# Patient Record
Sex: MALE | Race: OTHER | NOT HISPANIC OR LATINO | URBAN - METROPOLITAN AREA
[De-identification: names, ages, dates, MRNs, and addresses within clinical notes are randomized per-mention and may not be internally consistent; named-entity substitution may affect disease eponyms.]

---

## 2018-04-29 ENCOUNTER — EMERGENCY (EMERGENCY)
Facility: HOSPITAL | Age: 78
LOS: 1 days | Discharge: HOME | End: 2018-04-29
Attending: EMERGENCY MEDICINE
Payer: COMMERCIAL

## 2018-04-29 VITALS
DIASTOLIC BLOOD PRESSURE: 80 MMHG | SYSTOLIC BLOOD PRESSURE: 126 MMHG | OXYGEN SATURATION: 98 % | RESPIRATION RATE: 18 BRPM | HEART RATE: 84 BPM | WEIGHT: 160.06 LBS

## 2018-04-29 PROCEDURE — 70486 CT MAXILLOFACIAL W/O DYE: CPT

## 2018-04-29 PROCEDURE — 71046 X-RAY EXAM CHEST 2 VIEWS: CPT | Mod: 26

## 2018-04-29 PROCEDURE — 90715 TDAP VACCINE 7 YRS/> IM: CPT

## 2018-04-29 PROCEDURE — 72125 CT NECK SPINE W/O DYE: CPT

## 2018-04-29 PROCEDURE — 70450 CT HEAD/BRAIN W/O DYE: CPT

## 2018-04-29 PROCEDURE — 70486 CT MAXILLOFACIAL W/O DYE: CPT | Mod: 26

## 2018-04-29 PROCEDURE — 72128 CT CHEST SPINE W/O DYE: CPT | Mod: 26

## 2018-04-29 PROCEDURE — 90471 IMMUNIZATION ADMIN: CPT

## 2018-04-29 PROCEDURE — 70450 CT HEAD/BRAIN W/O DYE: CPT | Mod: 26

## 2018-04-29 PROCEDURE — 99284 EMERGENCY DEPT VISIT MOD MDM: CPT | Mod: 25

## 2018-04-29 PROCEDURE — 72128 CT CHEST SPINE W/O DYE: CPT

## 2018-04-29 PROCEDURE — 99284 EMERGENCY DEPT VISIT MOD MDM: CPT

## 2018-04-29 PROCEDURE — 72125 CT NECK SPINE W/O DYE: CPT | Mod: 26

## 2018-04-29 PROCEDURE — 71046 X-RAY EXAM CHEST 2 VIEWS: CPT

## 2018-04-29 RX ORDER — TETANUS TOXOID, REDUCED DIPHTHERIA TOXOID AND ACELLULAR PERTUSSIS VACCINE, ADSORBED 5; 2.5; 8; 8; 2.5 [IU]/.5ML; [IU]/.5ML; UG/.5ML; UG/.5ML; UG/.5ML
0.5 SUSPENSION INTRAMUSCULAR ONCE
Qty: 0 | Refills: 0 | Status: COMPLETED | OUTPATIENT
Start: 2018-04-29 | End: 2018-04-29

## 2018-04-29 RX ADMIN — TETANUS TOXOID, REDUCED DIPHTHERIA TOXOID AND ACELLULAR PERTUSSIS VACCINE, ADSORBED 0.5 MILLILITER(S): 5; 2.5; 8; 8; 2.5 SUSPENSION INTRAMUSCULAR at 17:04
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
## 2018-04-29 NOTE — ED PROVIDER NOTE - CONSTITUTIONAL, MLM
no itching/no rash normal... Well appearing, well nourished, awake, alert, oriented to person, place, time/situation and in no apparent distress.
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
## 2018-04-29 NOTE — ED PROVIDER NOTE - PLAN OF CARE
Rest, increase activity as tolerated.  -- Please use 650mg Tylenol (also called acetaminophen) every 4 hours & 600mg Motrin (also called Advil or ibuprofen) every 6 hours as needed for pain/discomfort/swelling. You can get these without a prescription. Don't use more than 3500mg of Tylenol in any 24-hour period. Make sure your other prescription/over-the-counter medications don't contain any Tylenol so you don't take too much. If you have any stomach discomfort while taking Motrin, you can use TUMS or Pepcid or Zantac (these can all be bought without a prescription).   REturn to the ER for any concerns.  follow up with your physician in the next week.
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
## 2020-07-19 ENCOUNTER — EMERGENCY (EMERGENCY)
Facility: HOSPITAL | Age: 80
LOS: 1 days | Discharge: HOME | End: 2020-07-19
Attending: EMERGENCY MEDICINE
Payer: MEDICARE

## 2020-07-19 VITALS
HEART RATE: 104 BPM | HEIGHT: 66.14 IN | DIASTOLIC BLOOD PRESSURE: 70 MMHG | OXYGEN SATURATION: 99 % | WEIGHT: 139.99 LBS | TEMPERATURE: 98 F | RESPIRATION RATE: 18 BRPM | SYSTOLIC BLOOD PRESSURE: 141 MMHG

## 2020-07-19 VITALS
RESPIRATION RATE: 18 BRPM | HEART RATE: 95 BPM | OXYGEN SATURATION: 98 % | DIASTOLIC BLOOD PRESSURE: 80 MMHG | SYSTOLIC BLOOD PRESSURE: 151 MMHG | TEMPERATURE: 98 F

## 2020-07-19 DIAGNOSIS — Z98.890 OTHER SPECIFIED POSTPROCEDURAL STATES: Chronic | ICD-10-CM

## 2020-07-19 LAB
ALBUMIN SERPL ELPH-MCNC: 4.3 G/DL — SIGNIFICANT CHANGE UP (ref 3.3–5)
ALP SERPL-CCNC: 99 U/L — SIGNIFICANT CHANGE UP (ref 40–120)
ALT FLD-CCNC: 16 U/L — SIGNIFICANT CHANGE UP (ref 10–45)
ANION GAP SERPL CALC-SCNC: 10 MMOL/L — SIGNIFICANT CHANGE UP (ref 5–17)
ANION GAP SERPL CALC-SCNC: 13 MMOL/L — SIGNIFICANT CHANGE UP (ref 5–17)
APPEARANCE UR: CLEAR — SIGNIFICANT CHANGE UP
AST SERPL-CCNC: 22 U/L — SIGNIFICANT CHANGE UP (ref 10–40)
BACTERIA # UR AUTO: NEGATIVE — SIGNIFICANT CHANGE UP
BASOPHILS # BLD AUTO: 0.06 K/UL — SIGNIFICANT CHANGE UP (ref 0–0.2)
BASOPHILS NFR BLD AUTO: 0.7 % — SIGNIFICANT CHANGE UP (ref 0–2)
BILIRUB SERPL-MCNC: 0.7 MG/DL — SIGNIFICANT CHANGE UP (ref 0.2–1.2)
BILIRUB UR-MCNC: NEGATIVE — SIGNIFICANT CHANGE UP
BUN SERPL-MCNC: 12 MG/DL — SIGNIFICANT CHANGE UP (ref 7–23)
BUN SERPL-MCNC: 9 MG/DL — SIGNIFICANT CHANGE UP (ref 7–23)
CALCIUM SERPL-MCNC: 8.3 MG/DL — LOW (ref 8.4–10.5)
CALCIUM SERPL-MCNC: 9.3 MG/DL — SIGNIFICANT CHANGE UP (ref 8.4–10.5)
CHLORIDE SERPL-SCNC: 94 MMOL/L — LOW (ref 96–108)
CHLORIDE SERPL-SCNC: 99 MMOL/L — SIGNIFICANT CHANGE UP (ref 96–108)
CO2 SERPL-SCNC: 22 MMOL/L — SIGNIFICANT CHANGE UP (ref 22–31)
CO2 SERPL-SCNC: 23 MMOL/L — SIGNIFICANT CHANGE UP (ref 22–31)
COLOR SPEC: YELLOW — SIGNIFICANT CHANGE UP
CREAT SERPL-MCNC: 0.61 MG/DL — SIGNIFICANT CHANGE UP (ref 0.5–1.3)
CREAT SERPL-MCNC: 0.67 MG/DL — SIGNIFICANT CHANGE UP (ref 0.5–1.3)
DIFF PNL FLD: ABNORMAL
EOSINOPHIL # BLD AUTO: 0.17 K/UL — SIGNIFICANT CHANGE UP (ref 0–0.5)
EOSINOPHIL NFR BLD AUTO: 2.1 % — SIGNIFICANT CHANGE UP (ref 0–6)
EPI CELLS # UR: 5 /HPF — SIGNIFICANT CHANGE UP
GLUCOSE SERPL-MCNC: 110 MG/DL — HIGH (ref 70–99)
GLUCOSE SERPL-MCNC: 183 MG/DL — HIGH (ref 70–99)
GLUCOSE UR QL: ABNORMAL
HCT VFR BLD CALC: 36.4 % — LOW (ref 39–50)
HGB BLD-MCNC: 12.5 G/DL — LOW (ref 13–17)
HYALINE CASTS # UR AUTO: 3 /LPF — HIGH (ref 0–2)
IMM GRANULOCYTES NFR BLD AUTO: 0.5 % — SIGNIFICANT CHANGE UP (ref 0–1.5)
KETONES UR-MCNC: SIGNIFICANT CHANGE UP
LEUKOCYTE ESTERASE UR-ACNC: NEGATIVE — SIGNIFICANT CHANGE UP
LYMPHOCYTES # BLD AUTO: 0.82 K/UL — LOW (ref 1–3.3)
LYMPHOCYTES # BLD AUTO: 10.2 % — LOW (ref 13–44)
MCHC RBC-ENTMCNC: 30.5 PG — SIGNIFICANT CHANGE UP (ref 27–34)
MCHC RBC-ENTMCNC: 34.3 GM/DL — SIGNIFICANT CHANGE UP (ref 32–36)
MCV RBC AUTO: 88.8 FL — SIGNIFICANT CHANGE UP (ref 80–100)
MONOCYTES # BLD AUTO: 0.69 K/UL — SIGNIFICANT CHANGE UP (ref 0–0.9)
MONOCYTES NFR BLD AUTO: 8.6 % — SIGNIFICANT CHANGE UP (ref 2–14)
NEUTROPHILS # BLD AUTO: 6.24 K/UL — SIGNIFICANT CHANGE UP (ref 1.8–7.4)
NEUTROPHILS NFR BLD AUTO: 77.9 % — HIGH (ref 43–77)
NITRITE UR-MCNC: NEGATIVE — SIGNIFICANT CHANGE UP
NRBC # BLD: 0 /100 WBCS — SIGNIFICANT CHANGE UP (ref 0–0)
PH UR: 7 — SIGNIFICANT CHANGE UP (ref 5–8)
PLATELET # BLD AUTO: 229 K/UL — SIGNIFICANT CHANGE UP (ref 150–400)
POTASSIUM SERPL-MCNC: 3.6 MMOL/L — SIGNIFICANT CHANGE UP (ref 3.5–5.3)
POTASSIUM SERPL-MCNC: 3.9 MMOL/L — SIGNIFICANT CHANGE UP (ref 3.5–5.3)
POTASSIUM SERPL-SCNC: 3.6 MMOL/L — SIGNIFICANT CHANGE UP (ref 3.5–5.3)
POTASSIUM SERPL-SCNC: 3.9 MMOL/L — SIGNIFICANT CHANGE UP (ref 3.5–5.3)
PROT SERPL-MCNC: 7.1 G/DL — SIGNIFICANT CHANGE UP (ref 6–8.3)
PROT UR-MCNC: ABNORMAL
RBC # BLD: 4.1 M/UL — LOW (ref 4.2–5.8)
RBC # FLD: 12.4 % — SIGNIFICANT CHANGE UP (ref 10.3–14.5)
RBC CASTS # UR COMP ASSIST: 10 /HPF — HIGH (ref 0–4)
SODIUM SERPL-SCNC: 129 MMOL/L — LOW (ref 135–145)
SODIUM SERPL-SCNC: 132 MMOL/L — LOW (ref 135–145)
SP GR SPEC: 1.02 — SIGNIFICANT CHANGE UP (ref 1.01–1.02)
UROBILINOGEN FLD QL: ABNORMAL
WBC # BLD: 8.02 K/UL — SIGNIFICANT CHANGE UP (ref 3.8–10.5)
WBC # FLD AUTO: 8.02 K/UL — SIGNIFICANT CHANGE UP (ref 3.8–10.5)
WBC UR QL: 1 /HPF — SIGNIFICANT CHANGE UP (ref 0–5)

## 2020-07-19 PROCEDURE — 74177 CT ABD & PELVIS W/CONTRAST: CPT | Mod: 26

## 2020-07-19 PROCEDURE — 74177 CT ABD & PELVIS W/CONTRAST: CPT

## 2020-07-19 PROCEDURE — 80053 COMPREHEN METABOLIC PANEL: CPT

## 2020-07-19 PROCEDURE — 99284 EMERGENCY DEPT VISIT MOD MDM: CPT

## 2020-07-19 PROCEDURE — 80048 BASIC METABOLIC PNL TOTAL CA: CPT

## 2020-07-19 PROCEDURE — 81001 URINALYSIS AUTO W/SCOPE: CPT

## 2020-07-19 PROCEDURE — 85027 COMPLETE CBC AUTOMATED: CPT

## 2020-07-19 PROCEDURE — 99284 EMERGENCY DEPT VISIT MOD MDM: CPT | Mod: 25

## 2020-07-19 PROCEDURE — 87086 URINE CULTURE/COLONY COUNT: CPT

## 2020-07-19 RX ORDER — SODIUM CHLORIDE 9 MG/ML
1000 INJECTION INTRAMUSCULAR; INTRAVENOUS; SUBCUTANEOUS ONCE
Refills: 0 | Status: COMPLETED | OUTPATIENT
Start: 2020-07-19 | End: 2020-07-19

## 2020-07-19 RX ORDER — ACETAMINOPHEN 500 MG
650 TABLET ORAL ONCE
Refills: 0 | Status: COMPLETED | OUTPATIENT
Start: 2020-07-19 | End: 2020-07-19

## 2020-07-19 RX ADMIN — SODIUM CHLORIDE 1000 MILLILITER(S): 9 INJECTION INTRAMUSCULAR; INTRAVENOUS; SUBCUTANEOUS at 18:19

## 2020-07-19 NOTE — ED PROVIDER NOTE - OBJECTIVE STATEMENT
79 y/o M with PMH DM, Depression, BPH, hard of hearing, PSH [employer] surgery (6 years ago for BPH at [facility]) presents to ED with L kidney and L knee pain. Patient poor historian. Patient states that pain is located on the left side of his abdomen/flank for the past three days. Patient states that he has not urinated since yesterday. Increased urgency but unable to void. States that he has had a Tobias catheter in the past, years ago. Also endorses L knee pain, relieved by placing his hand on his knee and feeling heat, unknown period of time.   PCP: Iam Ryan   Urologist: Leonides Park   Denies chest pain, SOB, vomiting, hematuria, known allergies. Former smoker.   Patient gave his PCP's phone number [phone]. Called and answered by his son in law (who is a surgeon) who states that he believes that patient is in urinary retention. Denies known allergies.  Mandarin : [id number] Alexia

## 2020-07-19 NOTE — ED PROVIDER NOTE - ATTENDING CONTRIBUTION TO CARE
80y M hx BPH sp Greenlight procedure here w few days of suprapubic and LLQ discomfort, difficulty urinating since the AM. No fevers, NVDC. Bladder scan with ~300cc of urine. WIll place trinidad, Check UA,  lytes. Likely DC w OP ortho FU. 80y M hx BPH sp Greenlight procedure here w few days of suprapubic and LLQ discomfort, difficulty urinating since the AM. No fevers, NVDC. Bladder scan with ~300cc of urine. WIll place trinidad, Check UA,  lytes. Likely DC w OP uro FU. Pt also noting L knee pain, chronic, no swelling, no redness. Has FROM. Suspect arthritis pain, declining Xray, will tx w tylenol.

## 2020-07-19 NOTE — ED PROVIDER NOTE - PHYSICAL EXAMINATION
GEN: Well Appearing, Nontoxic, NAD  HEENT: NC/AT, Symm Facies. PERRL, EOMI  CV: No JVD/Bruits or stridor;  +S1S2, RRR w/o m/g/r  RESP: CTAB w/o w/r/r  ABD: Soft, nt/nd, +BS. No guarding/rebound. No RUQ tender, no CVAT  EXT/MSK: No lower extremity edema or calf tenderness. FROMx4  Left knee: No erythema, nontender, FROM without discomfort  SKIN: No erythema, lesions or rash to left knee   Neuro: No focal deficits, AOX3 with normal speech

## 2020-07-19 NOTE — ED ADULT NURSE NOTE - OBJECTIVE STATEMENT
Pt c/o left "kidney" pain and inability to urinate since last night. Abdomen flat and non distended.

## 2020-07-19 NOTE — ED PROVIDER NOTE - NS ED ROS FT
Constitutional: No fever or chills  Eyes: No visual changes  CV: No chest pain or lower extremity edema  Resp: No SOB no cough  GI: No abd pain. No nausea or vomiting. No diarrhea.   : +left kidney pain +urinary urgency No dysuria, hematuria.   MSK: +left knee pain  Skin: No rash  Psych: No complaints   Neuro: No headache. No numbness or tingling. No weakness.

## 2020-07-19 NOTE — ED PROVIDER NOTE - NSFOLLOWUPINSTRUCTIONS_ED_ALL_ED_FT
1. Rest. Stay hydrated.   2. Continue your current home medications. Please leave the Tobias catheter in place.   3. Follow-up with your medical doctor in 2-3 days. Please call your urologist tomorrow to make an appointment within the next 2-3 days. Bring your results with you for follow-up.  4. Return to the ER if you have any new or worsening symptoms, blood in urine, pain in penis/abdomen, vomiting, chest pain, difficulty breathing, fevers, chills, weakness, or any other concerns. 1. Rest. Stay hydrated.   2. Continue your current home medications. Please leave the Tobias catheter in place.   3. Follow-up with your medical doctor in 2-3 days. Please call your urologist tomorrow to make an appointment within the next 2-3 days. Bring your results with you for follow-up.  4. Return to the ER if you have any new or worsening symptoms, blood in urine, pain in penis/abdomen, vomiting, chest pain, difficulty breathing, fevers, chills, weakness, or any other concerns.  5. Follow up with urologist at [phone]- call for appointment

## 2020-07-19 NOTE — ED PROVIDER NOTE - PMH
Type 2 diabetes mellitus without complication, unspecified whether long term insulin use Depression    Type 2 diabetes mellitus without complication, unspecified whether long term insulin use

## 2020-07-19 NOTE — ED PROVIDER NOTE - PATIENT PORTAL LINK FT
You can access the FollowMyHealth Patient Portal offered by [facility] by registering at the following website: [email or web address]. By joining [facility]’s FollowMyHealth portal, you will also be able to view your health information using other applications (apps) compatible with our system.

## 2020-07-20 LAB
CULTURE RESULTS: NO GROWTH — SIGNIFICANT CHANGE UP
SPECIMEN SOURCE: SIGNIFICANT CHANGE UP

## 2020-11-05 PROBLEM — Z00.00 ENCOUNTER FOR PREVENTIVE HEALTH EXAMINATION: Status: ACTIVE | Noted: 2020-11-05

## 2020-11-12 ENCOUNTER — OUTPATIENT (OUTPATIENT)
Dept: OUTPATIENT SERVICES | Facility: HOSPITAL | Age: 80
LOS: 1 days | End: 2020-11-12
Payer: MEDICARE

## 2020-11-12 ENCOUNTER — APPOINTMENT (OUTPATIENT)
Dept: ULTRASOUND IMAGING | Facility: CLINIC | Age: 80
End: 2020-11-12
Payer: MEDICARE

## 2020-11-12 DIAGNOSIS — Z98.890 OTHER SPECIFIED POSTPROCEDURAL STATES: Chronic | ICD-10-CM

## 2020-11-12 DIAGNOSIS — Z00.8 ENCOUNTER FOR OTHER GENERAL EXAMINATION: ICD-10-CM

## 2020-11-12 PROCEDURE — 76770 US EXAM ABDO BACK WALL COMP: CPT

## 2020-11-12 PROCEDURE — 76770 US EXAM ABDO BACK WALL COMP: CPT | Mod: 26

## 2021-05-28 ENCOUNTER — INPATIENT (INPATIENT)
Facility: HOSPITAL | Age: 81
LOS: 1 days | Discharge: HOME | DRG: 312 | End: 2021-05-30
Attending: INTERNAL MEDICINE | Admitting: INTERNAL MEDICINE
Payer: MEDICARE

## 2021-05-28 VITALS
TEMPERATURE: 98 F | RESPIRATION RATE: 18 BRPM | HEART RATE: 90 BPM | DIASTOLIC BLOOD PRESSURE: 58 MMHG | WEIGHT: 139.99 LBS | SYSTOLIC BLOOD PRESSURE: 115 MMHG | OXYGEN SATURATION: 100 % | HEIGHT: 64 IN

## 2021-05-28 DIAGNOSIS — R53.1 WEAKNESS: ICD-10-CM

## 2021-05-28 DIAGNOSIS — Z98.890 OTHER SPECIFIED POSTPROCEDURAL STATES: Chronic | ICD-10-CM

## 2021-05-28 LAB
ALBUMIN SERPL ELPH-MCNC: 4.3 G/DL — SIGNIFICANT CHANGE UP (ref 3.3–5)
ALP SERPL-CCNC: 95 U/L — SIGNIFICANT CHANGE UP (ref 40–120)
ALT FLD-CCNC: 16 U/L — SIGNIFICANT CHANGE UP (ref 10–45)
ANION GAP SERPL CALC-SCNC: 16 MMOL/L — SIGNIFICANT CHANGE UP (ref 5–17)
APPEARANCE UR: CLEAR — SIGNIFICANT CHANGE UP
AST SERPL-CCNC: 32 U/L — SIGNIFICANT CHANGE UP (ref 10–40)
BACTERIA # UR AUTO: NEGATIVE — SIGNIFICANT CHANGE UP
BASE EXCESS BLDV CALC-SCNC: -0.8 MMOL/L — SIGNIFICANT CHANGE UP (ref -2–2)
BASOPHILS # BLD AUTO: 0.08 K/UL — SIGNIFICANT CHANGE UP (ref 0–0.2)
BASOPHILS NFR BLD AUTO: 0.4 % — SIGNIFICANT CHANGE UP (ref 0–2)
BILIRUB SERPL-MCNC: 0.4 MG/DL — SIGNIFICANT CHANGE UP (ref 0.2–1.2)
BILIRUB UR-MCNC: NEGATIVE — SIGNIFICANT CHANGE UP
BUN SERPL-MCNC: 19 MG/DL — SIGNIFICANT CHANGE UP (ref 7–23)
CA-I SERPL-SCNC: 1.15 MMOL/L — SIGNIFICANT CHANGE UP (ref 1.12–1.3)
CALCIUM SERPL-MCNC: 9.3 MG/DL — SIGNIFICANT CHANGE UP (ref 8.4–10.5)
CHLORIDE BLDV-SCNC: 104 MMOL/L — SIGNIFICANT CHANGE UP (ref 96–108)
CHLORIDE SERPL-SCNC: 99 MMOL/L — SIGNIFICANT CHANGE UP (ref 96–108)
CO2 BLDV-SCNC: 26 MMOL/L — SIGNIFICANT CHANGE UP (ref 22–30)
CO2 SERPL-SCNC: 19 MMOL/L — LOW (ref 22–31)
COLOR SPEC: SIGNIFICANT CHANGE UP
CREAT SERPL-MCNC: 0.88 MG/DL — SIGNIFICANT CHANGE UP (ref 0.5–1.3)
DIFF PNL FLD: ABNORMAL
EOSINOPHIL # BLD AUTO: 0.31 K/UL — SIGNIFICANT CHANGE UP (ref 0–0.5)
EOSINOPHIL NFR BLD AUTO: 1.7 % — SIGNIFICANT CHANGE UP (ref 0–6)
EPI CELLS # UR: 0 /HPF — SIGNIFICANT CHANGE UP
GAS PNL BLDV: 134 MMOL/L — LOW (ref 135–145)
GAS PNL BLDV: SIGNIFICANT CHANGE UP
GAS PNL BLDV: SIGNIFICANT CHANGE UP
GLUCOSE BLDV-MCNC: 123 MG/DL — HIGH (ref 70–99)
GLUCOSE SERPL-MCNC: 120 MG/DL — HIGH (ref 70–99)
GLUCOSE UR QL: NEGATIVE — SIGNIFICANT CHANGE UP
HCO3 BLDV-SCNC: 25 MMOL/L — SIGNIFICANT CHANGE UP (ref 21–29)
HCT VFR BLD CALC: 38.2 % — LOW (ref 39–50)
HCT VFR BLDA CALC: 40 % — SIGNIFICANT CHANGE UP (ref 39–50)
HGB BLD CALC-MCNC: 13.1 G/DL — SIGNIFICANT CHANGE UP (ref 13–17)
HGB BLD-MCNC: 13 G/DL — SIGNIFICANT CHANGE UP (ref 13–17)
HYALINE CASTS # UR AUTO: 1 /LPF — SIGNIFICANT CHANGE UP (ref 0–2)
IMM GRANULOCYTES NFR BLD AUTO: 1.2 % — SIGNIFICANT CHANGE UP (ref 0–1.5)
KETONES UR-MCNC: NEGATIVE — SIGNIFICANT CHANGE UP
LACTATE BLDV-MCNC: 2.1 MMOL/L — HIGH (ref 0.7–2)
LEUKOCYTE ESTERASE UR-ACNC: NEGATIVE — SIGNIFICANT CHANGE UP
LYMPHOCYTES # BLD AUTO: 0.7 K/UL — LOW (ref 1–3.3)
LYMPHOCYTES # BLD AUTO: 3.8 % — LOW (ref 13–44)
MCHC RBC-ENTMCNC: 30.4 PG — SIGNIFICANT CHANGE UP (ref 27–34)
MCHC RBC-ENTMCNC: 34 GM/DL — SIGNIFICANT CHANGE UP (ref 32–36)
MCV RBC AUTO: 89.3 FL — SIGNIFICANT CHANGE UP (ref 80–100)
MONOCYTES # BLD AUTO: 0.95 K/UL — HIGH (ref 0–0.9)
MONOCYTES NFR BLD AUTO: 5.1 % — SIGNIFICANT CHANGE UP (ref 2–14)
NEUTROPHILS # BLD AUTO: 16.23 K/UL — HIGH (ref 1.8–7.4)
NEUTROPHILS NFR BLD AUTO: 87.8 % — HIGH (ref 43–77)
NITRITE UR-MCNC: NEGATIVE — SIGNIFICANT CHANGE UP
NRBC # BLD: 0 /100 WBCS — SIGNIFICANT CHANGE UP (ref 0–0)
OB PNL STL: NEGATIVE — SIGNIFICANT CHANGE UP
PCO2 BLDV: 49 MMHG — SIGNIFICANT CHANGE UP (ref 35–50)
PH BLDV: 7.33 — LOW (ref 7.35–7.45)
PH UR: 6 — SIGNIFICANT CHANGE UP (ref 5–8)
PLATELET # BLD AUTO: 221 K/UL — SIGNIFICANT CHANGE UP (ref 150–400)
PO2 BLDV: 31 MMHG — SIGNIFICANT CHANGE UP (ref 25–45)
POTASSIUM BLDV-SCNC: 3.9 MMOL/L — SIGNIFICANT CHANGE UP (ref 3.5–5.3)
POTASSIUM SERPL-MCNC: 4.9 MMOL/L — SIGNIFICANT CHANGE UP (ref 3.5–5.3)
POTASSIUM SERPL-SCNC: 4.9 MMOL/L — SIGNIFICANT CHANGE UP (ref 3.5–5.3)
PROT SERPL-MCNC: 7.9 G/DL — SIGNIFICANT CHANGE UP (ref 6–8.3)
PROT UR-MCNC: SIGNIFICANT CHANGE UP
RBC # BLD: 4.28 M/UL — SIGNIFICANT CHANGE UP (ref 4.2–5.8)
RBC # FLD: 12.6 % — SIGNIFICANT CHANGE UP (ref 10.3–14.5)
RBC CASTS # UR COMP ASSIST: 5 /HPF — HIGH (ref 0–4)
SAO2 % BLDV: 57 % — LOW (ref 67–88)
SARS-COV-2 RNA SPEC QL NAA+PROBE: SIGNIFICANT CHANGE UP
SODIUM SERPL-SCNC: 134 MMOL/L — LOW (ref 135–145)
SP GR SPEC: 1.01 — SIGNIFICANT CHANGE UP (ref 1.01–1.02)
TROPONIN T, HIGH SENSITIVITY RESULT: 21 NG/L — SIGNIFICANT CHANGE UP (ref 0–51)
TROPONIN T, HIGH SENSITIVITY RESULT: 22 NG/L — SIGNIFICANT CHANGE UP (ref 0–51)
UROBILINOGEN FLD QL: NEGATIVE — SIGNIFICANT CHANGE UP
WBC # BLD: 18.49 K/UL — HIGH (ref 3.8–10.5)
WBC # FLD AUTO: 18.49 K/UL — HIGH (ref 3.8–10.5)
WBC UR QL: 1 /HPF — SIGNIFICANT CHANGE UP (ref 0–5)

## 2021-05-28 PROCEDURE — 93010 ELECTROCARDIOGRAM REPORT: CPT | Mod: GC

## 2021-05-28 PROCEDURE — 71045 X-RAY EXAM CHEST 1 VIEW: CPT | Mod: 26

## 2021-05-28 PROCEDURE — 99285 EMERGENCY DEPT VISIT HI MDM: CPT | Mod: CS,GC

## 2021-05-28 RX ORDER — SODIUM CHLORIDE 9 MG/ML
1000 INJECTION INTRAMUSCULAR; INTRAVENOUS; SUBCUTANEOUS ONCE
Refills: 0 | Status: COMPLETED | OUTPATIENT
Start: 2021-05-28 | End: 2021-05-28

## 2021-05-28 RX ADMIN — SODIUM CHLORIDE 1000 MILLILITER(S): 9 INJECTION INTRAMUSCULAR; INTRAVENOUS; SUBCUTANEOUS at 15:47

## 2021-05-28 NOTE — ED PROVIDER NOTE - PHYSICAL EXAMINATION
GENERAL: Awake, alert, NAD  HEENT: NC/AT, moist mucous membranes  LUNGS: CTAB, no wheezes or crackles   CARDIAC: RRR, no m/r/g  ABDOMEN: Soft, normal BS, non tender, non distended, no rebound, no guarding  BACK: No midline spinal tenderness, no CVA tenderness  EXT: No edema, no calf tenderness, 2+ DP pulses bilaterally, no deformities.  NEURO: A&Ox3. Moving all extremities.  SKIN: Warm and dry. No rash.  PSYCH: Normal affect.

## 2021-05-28 NOTE — ED ADULT NURSE REASSESSMENT NOTE - NS ED NURSE REASSESS COMMENT FT1
Patient stood at bedside with one assist and steady on feet (no complaints of dizziness, headache), unable to pee. ED MD Ashley meza. Patient stood at bedside with one assist and steady on feet (no complaints of dizziness, headache), unable to pee. ED MD Ramirez aware. Patient re-educated about use of call bell and to ring for help if assistance is required; verbalized understanding. Both side rails up for safety, wheels locked and bed in lowest position.

## 2021-05-28 NOTE — ED PROVIDER NOTE - ATTENDING CONTRIBUTION TO CARE
79 y/o m Mandarin speaking male ( [id number]) had an episode of generalized weakness today. got up usual time and felt in usual state of health but then felt a generalized fatigue and had to sit. unable to get up, pushed himself to another room and laid down. no improvement, came to hospital no new meds. no fever no cough no chills no abd pain or chest pain. still feels tired and without any energy. no focal weakness. no urinary complaints.   Gen.  no acute distress, elderly male  HEENT:  Pupils 3mm reactive to light b/l eomi, dry mm  Lungs:  b/l bs  CVS: S1S2   Abd;  soft non tender no distention  Ext: no pitting edema or erythema  Neuro: aaox3 no focal deficits  MSK: strength 5/5 b/l upper and lower ext.

## 2021-05-28 NOTE — ED ADULT NURSE REASSESSMENT NOTE - NS ED NURSE REASSESS COMMENT FT1
pt reports dizziness upon standing for orthostatic VS, pt found to be hypotensive and tachycardic when standing
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
## 2021-05-28 NOTE — ED ADULT NURSE REASSESSMENT NOTE - NS ED NURSE REASSESS COMMENT FT1
Report received from Dontrell MARTIN. Patient resting in bed. Safety and comfort measures maintained.

## 2021-05-28 NOTE — ED PROVIDER NOTE - OBJECTIVE STATEMENT
79 y/o male with hx of HTN and DM presenting to the ED c/o weakness. Reports he was walking to the bathroom this morning when he felt lightheaded and had to sit down. Was unable to stand up and crawled to the bed where he pulled himself up. Now feeling persistently weak and lightheaded. Denies CP, SOB, fevers, chills, nausea, vomiting, rectal bleeding melena.
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
## 2021-05-28 NOTE — ED PROVIDER NOTE - CLINICAL SUMMARY MEDICAL DECISION MAKING FREE TEXT BOX
79 y/o male with hx of HTN, DM presenting to the ED c/o weakness. Patient hypotensive and orthostatic. Exam with no focal abnormality. Concern for acute causes of orthostatic hypotension i.e dehydration vs anemia vs less likely syncope. Plan for labs, fluids, patient will likely require admission. Montez Pagan, DO PGY2 81 y/o male with hx of HTN, DM presenting to the ED c/o weakness. Patient hypotensive and orthostatic. Exam with no focal abnormality. Concern for acute causes of orthostatic hypotension i.e dehydration vs anemia vs less likely syncope. Plan for labs, fluids, patient will likely require admission. Montez Pagan, DO PGY2  ATTG: : weakness and fatigue (generalized) cocnern for infection / cardiac check labs, check urinalyisis, check xray chest, ivf, and re eval for dispo

## 2021-05-28 NOTE — ED ADULT NURSE REASSESSMENT NOTE - NS ED NURSE REASSESS COMMENT FT1
Patient straight cathed for residual urine (650mLs of yellow urine, no odor noted, no cloudiness). RN and PCA at bedside to ensure sterility.

## 2021-05-29 ENCOUNTER — TRANSCRIPTION ENCOUNTER (OUTPATIENT)
Age: 81
End: 2021-05-29

## 2021-05-29 DIAGNOSIS — Z29.9 ENCOUNTER FOR PROPHYLACTIC MEASURES, UNSPECIFIED: ICD-10-CM

## 2021-05-29 DIAGNOSIS — R65.10 SYSTEMIC INFLAMMATORY RESPONSE SYNDROME (SIRS) OF NON-INFECTIOUS ORIGIN WITHOUT ACUTE ORGAN DYSFUNCTION: ICD-10-CM

## 2021-05-29 DIAGNOSIS — D72.829 ELEVATED WHITE BLOOD CELL COUNT, UNSPECIFIED: ICD-10-CM

## 2021-05-29 DIAGNOSIS — R53.1 WEAKNESS: ICD-10-CM

## 2021-05-29 DIAGNOSIS — R33.9 RETENTION OF URINE, UNSPECIFIED: ICD-10-CM

## 2021-05-29 DIAGNOSIS — Z90.49 ACQUIRED ABSENCE OF OTHER SPECIFIED PARTS OF DIGESTIVE TRACT: Chronic | ICD-10-CM

## 2021-05-29 DIAGNOSIS — E11.9 TYPE 2 DIABETES MELLITUS WITHOUT COMPLICATIONS: ICD-10-CM

## 2021-05-29 DIAGNOSIS — I10 ESSENTIAL (PRIMARY) HYPERTENSION: ICD-10-CM

## 2021-05-29 LAB
A1C WITH ESTIMATED AVERAGE GLUCOSE RESULT: 6.4 % — HIGH (ref 4–5.6)
ALBUMIN SERPL ELPH-MCNC: 4.5 G/DL — SIGNIFICANT CHANGE UP (ref 3.3–5)
ALP SERPL-CCNC: 92 U/L — SIGNIFICANT CHANGE UP (ref 40–120)
ALT FLD-CCNC: 13 U/L — SIGNIFICANT CHANGE UP (ref 10–45)
ANION GAP SERPL CALC-SCNC: 17 MMOL/L — SIGNIFICANT CHANGE UP (ref 5–17)
AST SERPL-CCNC: 19 U/L — SIGNIFICANT CHANGE UP (ref 10–40)
BILIRUB SERPL-MCNC: 0.9 MG/DL — SIGNIFICANT CHANGE UP (ref 0.2–1.2)
BUN SERPL-MCNC: 16 MG/DL — SIGNIFICANT CHANGE UP (ref 7–23)
CALCIUM SERPL-MCNC: 9.4 MG/DL — SIGNIFICANT CHANGE UP (ref 8.4–10.5)
CHLORIDE SERPL-SCNC: 101 MMOL/L — SIGNIFICANT CHANGE UP (ref 96–108)
CO2 SERPL-SCNC: 20 MMOL/L — LOW (ref 22–31)
CREAT SERPL-MCNC: 0.9 MG/DL — SIGNIFICANT CHANGE UP (ref 0.5–1.3)
CULTURE RESULTS: NO GROWTH — SIGNIFICANT CHANGE UP
ESTIMATED AVERAGE GLUCOSE: 137 MG/DL — HIGH (ref 68–114)
GLUCOSE SERPL-MCNC: 100 MG/DL — HIGH (ref 70–99)
HCT VFR BLD CALC: 42.3 % — SIGNIFICANT CHANGE UP (ref 39–50)
HGB BLD-MCNC: 14.3 G/DL — SIGNIFICANT CHANGE UP (ref 13–17)
LACTATE SERPL-SCNC: 2.5 MMOL/L — HIGH (ref 0.7–2)
MAGNESIUM SERPL-MCNC: 1.9 MG/DL — SIGNIFICANT CHANGE UP (ref 1.6–2.6)
MCHC RBC-ENTMCNC: 30.5 PG — SIGNIFICANT CHANGE UP (ref 27–34)
MCHC RBC-ENTMCNC: 33.8 GM/DL — SIGNIFICANT CHANGE UP (ref 32–36)
MCV RBC AUTO: 90.2 FL — SIGNIFICANT CHANGE UP (ref 80–100)
NRBC # BLD: 0 /100 WBCS — SIGNIFICANT CHANGE UP (ref 0–0)
PHOSPHATE SERPL-MCNC: 3.2 MG/DL — SIGNIFICANT CHANGE UP (ref 2.5–4.5)
PLATELET # BLD AUTO: 248 K/UL — SIGNIFICANT CHANGE UP (ref 150–400)
POTASSIUM SERPL-MCNC: 3.5 MMOL/L — SIGNIFICANT CHANGE UP (ref 3.5–5.3)
POTASSIUM SERPL-SCNC: 3.5 MMOL/L — SIGNIFICANT CHANGE UP (ref 3.5–5.3)
PROT SERPL-MCNC: 8 G/DL — SIGNIFICANT CHANGE UP (ref 6–8.3)
RBC # BLD: 4.69 M/UL — SIGNIFICANT CHANGE UP (ref 4.2–5.8)
RBC # FLD: 12.6 % — SIGNIFICANT CHANGE UP (ref 10.3–14.5)
SODIUM SERPL-SCNC: 138 MMOL/L — SIGNIFICANT CHANGE UP (ref 135–145)
SPECIMEN SOURCE: SIGNIFICANT CHANGE UP
TSH SERPL-MCNC: 1.37 UIU/ML — SIGNIFICANT CHANGE UP (ref 0.27–4.2)
WBC # BLD: 12.73 K/UL — HIGH (ref 3.8–10.5)
WBC # FLD AUTO: 12.73 K/UL — HIGH (ref 3.8–10.5)

## 2021-05-29 PROCEDURE — 99223 1ST HOSP IP/OBS HIGH 75: CPT | Mod: GC

## 2021-05-29 PROCEDURE — 12345: CPT | Mod: NC,GC

## 2021-05-29 RX ORDER — GLUCAGON INJECTION, SOLUTION 0.5 MG/.1ML
1 INJECTION, SOLUTION SUBCUTANEOUS ONCE
Refills: 0 | Status: DISCONTINUED | OUTPATIENT
Start: 2021-05-29 | End: 2021-05-30

## 2021-05-29 RX ORDER — INSULIN LISPRO 100/ML
VIAL (ML) SUBCUTANEOUS AT BEDTIME
Refills: 0 | Status: DISCONTINUED | OUTPATIENT
Start: 2021-05-29 | End: 2021-05-30

## 2021-05-29 RX ORDER — DEXTROSE 50 % IN WATER 50 %
25 SYRINGE (ML) INTRAVENOUS ONCE
Refills: 0 | Status: DISCONTINUED | OUTPATIENT
Start: 2021-05-29 | End: 2021-05-30

## 2021-05-29 RX ORDER — INSULIN LISPRO 100/ML
VIAL (ML) SUBCUTANEOUS
Refills: 0 | Status: DISCONTINUED | OUTPATIENT
Start: 2021-05-29 | End: 2021-05-30

## 2021-05-29 RX ORDER — DEXTROSE 50 % IN WATER 50 %
15 SYRINGE (ML) INTRAVENOUS ONCE
Refills: 0 | Status: DISCONTINUED | OUTPATIENT
Start: 2021-05-29 | End: 2021-05-30

## 2021-05-29 RX ORDER — DEXTROSE 50 % IN WATER 50 %
12.5 SYRINGE (ML) INTRAVENOUS ONCE
Refills: 0 | Status: DISCONTINUED | OUTPATIENT
Start: 2021-05-29 | End: 2021-05-30

## 2021-05-29 RX ORDER — SODIUM CHLORIDE 9 MG/ML
1000 INJECTION, SOLUTION INTRAVENOUS
Refills: 0 | Status: DISCONTINUED | OUTPATIENT
Start: 2021-05-29 | End: 2021-05-30

## 2021-05-29 RX ORDER — SODIUM CHLORIDE 9 MG/ML
1000 INJECTION, SOLUTION INTRAVENOUS
Refills: 0 | Status: COMPLETED | OUTPATIENT
Start: 2021-05-29 | End: 2021-05-30

## 2021-05-29 RX ORDER — SODIUM CHLORIDE 9 MG/ML
1000 INJECTION, SOLUTION INTRAVENOUS
Refills: 0 | Status: DISCONTINUED | OUTPATIENT
Start: 2021-05-29 | End: 2021-05-29

## 2021-05-29 RX ADMIN — SODIUM CHLORIDE 100 MILLILITER(S): 9 INJECTION, SOLUTION INTRAVENOUS at 16:45

## 2021-05-29 NOTE — H&P ADULT - NSHPREVIEWOFSYSTEMS_GEN_ALL_CORE
REVIEW OF SYSTEMS:    CONSTITUTIONAL: +generalized weakness prior (No fevers or chills)  EYES/ENT: No visual changes;  No vertigo or throat pain   NECK: No pain or stiffness  RESPIRATORY: No cough, wheezing, hemoptysis; No shortness of breath  CARDIOVASCULAR: No chest pain or palpitations  GASTROINTESTINAL: No abdominal or epigastric pain. No nausea, vomiting, or hematemesis; No diarrhea or constipation. No melena or hematochezia.  GENITOURINARY: No dysuria, frequency or hematuria  NEUROLOGICAL: No numbness or weakness  SKIN: No itching, burning, rashes, or lesions   All other review of systems is negative unless indicated above. REVIEW OF SYSTEMS:    CONSTITUTIONAL: +generalized weakness prior (No fevers or chills)  EYES/ENT: No visual changes;  No vertigo or throat pain   NECK: No pain or stiffness  RESPIRATORY: No cough, wheezing, hemoptysis; No shortness of breath  CARDIOVASCULAR: No chest pain or palpitations  GASTROINTESTINAL: No abdominal or epigastric pain. No nausea, vomiting, or hematemesis; No diarrhea or constipation. No melena or hematochezia.  GENITOURINARY: No dysuria, frequency or hematuria  NEUROLOGICAL: No numbness or weakness  SKIN: No itching, burning, rashes, or lesions   Psych: no anxiety or depression  All other review of systems is negative unless indicated above.

## 2021-05-29 NOTE — H&P ADULT - NSHPSOCIALHISTORY_GEN_ALL_CORE
Denies smoking cigarettes, drinking EtOH, or using illicit/recreational drug us. Lives with wife at home.

## 2021-05-29 NOTE — H&P ADULT - PROBLEM SELECTOR PLAN 1
Pt presented for generalized weakness c/b fall w/o LOC or head trauma.  - likely 2/2 orthostatic hypotension (positive test in ED) vs infection of unclear source vs malnutrition (?"tea and toast diet")  - s/p 1L NS bolus in ED  - recheck orthostatics  - start multivitamin  - continue to reassess need for further IVF Pt presented for generalized weakness c/b fall w/o LOC or head trauma.  - likely 2/2 orthostatic hypotension (positive test in ED) vs infection of unclear source vs malnutrition (?"tea and toast diet") vs ?malignancy  - s/p 1L NS bolus in ED  - recheck orthostatics  - continue to reassess need for further IVF  - start multivitamin Pt presented for generalized weakness c/b fall w/o LOC or head trauma.  - likely 2/2 orthostatic hypotension (positive test in ED) vs infection of unclear source vs malnutrition (?"tea and toast diet") vs ?malignancy vs arrhythmia   - s/p 1L NS bolus in ED  - EKG (5/28): sinus with 1st degree AV block w/ PACs and J waves  - recheck orthostatics  - continue to reassess need for further IVF  - start multivitamin Pt presented for generalized weakness c/b fall w/o LOC or head trauma.  - likely 2/2 orthostatic hypotension (positive test in ED) vs infection of unclear source vs malnutrition (?"tea and toast diet") vs ?malignancy vs arrhythmia   - s/p 1L NS bolus in ED  - EKG (5/28): sinus with 1st degree AV block w/ PACs and J waves  - recheck orthostatics  - check ESR, TSH  - continue to reassess need for further IVF  - start multivitamin  - low threshold for CTH given elderly age and recent fall (but denies head trauma and LOC) Pt presented for generalized weakness c/b fall w/o LOC or head trauma.  - likely 2/2 orthostatic hypotension (positive test in ED) vs infection of unclear source vs malnutrition (?"tea and toast diet") vs ?malignancy vs arrhythmia   - s/p 1L NS bolus in ED  - EKG (5/28): sinus with 1st degree AV block w/ PACs and J waves  - f/u repeat orthostatics  - f/u ESR, TSH  - continue to reassess need for further IVF  - start multivitamin  - low threshold for CTH given elderly age and recent fall (but denies head trauma and LOC)

## 2021-05-29 NOTE — PROGRESS NOTE ADULT - PROBLEM SELECTOR PLAN 1
Pt presented for generalized weakness c/b fall w/o LOC or head trauma.  - likely 2/2 orthostatic hypotension (positive test in ED) vs infection of unclear source vs malnutrition (?"tea and toast diet") vs ?malignancy vs arrhythmia   - s/p 1L NS bolus in ED  - EKG (5/28): sinus with 1st degree AV block w/ PACs and J waves  - f/u repeat orthostatics  - f/u ESR, TSH  - continue to reassess need for further IVF  - start multivitamin  - low threshold for CTH given elderly age and recent fall (but denies head trauma and LOC) Pt presented for generalized weakness c/b fall w/o LOC or head trauma.  - likely 2/2 orthostatic hypotension (positive test in ED) vs infection of unclear source vs malnutrition (?"tea and toast diet") vs ?malignancy vs arrhythmia   - s/p 1L NS bolus in ED  - EKG (5/28): sinus with 1st degree AV block w/ PACs and J waves  - f/u repeat orthostatics  - f/u ESR, TSH  - continue to reassess need for further IVF  - start multivitamin  - low threshold for CTH given elderly age and recent fall (but denies head trauma and LOC)  - PT eval

## 2021-05-29 NOTE — PROGRESS NOTE ADULT - PROBLEM SELECTOR PLAN 2
Pt presented with leukocytosis of WBC 18.49k and tachycardia to . Lactate elevated to 2.1 on presentation. No obvious source of infection. No fevers/chills noted.  - UA neg. COVID-19 neg. CXR clear.  - possibly in setting of ?dehydration vs infection of unclear source vs ?malignancy  - monitor off abx for now  - obtain CT A/P w/ IV contrast  - f/u BCx  - monitor BMs given report of soft stools; if diarrhea, send stool studies  - continue to monitor WBC and vital signs for improvement Pt presented with leukocytosis of WBC 18.49k and tachycardia to . Lactate elevated to 2.1 on presentation. No obvious source of infection. No fevers/chills noted.   - UA neg. COVID-19 neg. CXR clear.  - possibly in setting of ?dehydration vs infection of unclear source vs ?malignancy  - monitor off abx for now  - f/u BCx  - monitor BMs given report of soft stools; if diarrhea, send stool studies  - continue to monitor WBC and vital signs for improvement

## 2021-05-29 NOTE — H&P ADULT - NSHPPHYSICALEXAM_GEN_ALL_CORE
Vital Signs Last 24 Hrs  T(C): 36.7 (29 May 2021 05:21), Max: 36.9 (28 May 2021 16:00)  T(F): 98.1 (29 May 2021 05:21), Max: 98.5 (28 May 2021 16:00)  HR: 104 (29 May 2021 05:21) (90 - 104)  BP: 149/69 (29 May 2021 05:21) (100/60 - 149/69)  BP(mean): 73 (28 May 2021 23:00) (73 - 73)  RR: 18 (29 May 2021 05:21) (17 - 21)  SpO2: 98% (29 May 2021 05:21) (98% - 100%)    GENERAL: NAD, elderly man resting in bed  HEENT: PERRL, sclera clear  CHEST/LUNG: Clear to auscultation bilaterally; No wheezes or rales  HEART: Regular rate and rhythm; No murmurs, rubs, or gallops  ABDOMEN: Soft, Nontender, Nondistended; Bowel sounds present  EXTREMITIES:  2+ Peripheral Pulses, No clubbing, cyanosis, or edema  PSYCH: AAOx3  NEUROLOGY: non-focal  SKIN: No rashes or lesions Vital Signs Last 24 Hrs  T(C): 36.7 (29 May 2021 05:21), Max: 36.9 (28 May 2021 16:00)  T(F): 98.1 (29 May 2021 05:21), Max: 98.5 (28 May 2021 16:00)  HR: 104 (29 May 2021 05:21) (90 - 104)  BP: 149/69 (29 May 2021 05:21) (100/60 - 149/69)  BP(mean): 73 (28 May 2021 23:00) (73 - 73)  RR: 18 (29 May 2021 05:21) (17 - 21)  SpO2: 98% (29 May 2021 05:21) (98% - 100%)    GENERAL: NAD, elderly man resting in bed  HEAD:  Atraumatic, Normocephalic  EYES: PERRL, sclera clear  CHEST/LUNG: Clear to auscultation bilaterally; No wheezes or rales  HEART: Regular rate and rhythm; No murmurs, rubs, or gallops  ABDOMEN: Soft, Nontender, Nondistended; Bowel sounds present  EXTREMITIES:  2+ Peripheral Pulses, No clubbing, cyanosis, or edema  PSYCH: AAOx3  NEUROLOGY: non-focal  SKIN: No rashes or lesions Vital Signs Last 24 Hrs  T(C): 36.7 (29 May 2021 05:21), Max: 36.9 (28 May 2021 16:00)  T(F): 98.1 (29 May 2021 05:21), Max: 98.5 (28 May 2021 16:00)  HR: 104 (29 May 2021 05:21) (90 - 104)  BP: 149/69 (29 May 2021 05:21) (100/60 - 149/69)  BP(mean): 73 (28 May 2021 23:00) (73 - 73)  RR: 18 (29 May 2021 05:21) (17 - 21)  SpO2: 98% (29 May 2021 05:21) (98% - 100%)    GENERAL: NAD, elderly man resting in bed  HEAD: Atraumatic, Normocephalic  EYES: PERRL, sclera clear  CHEST/LUNG: Clear to auscultation bilaterally; No wheezes or rales  HEART: Regular rate and rhythm; No murmurs, rubs, or gallops  ABDOMEN: Soft, Nontender, Nondistended; Bowel sounds present  EXTREMITIES:  2+ Peripheral Pulses, No clubbing, cyanosis, or edema  PSYCH: AAOx3 (oriented to self, place, and year)  NEUROLOGY: non-focal, able to ambulate without difficulty  SKIN: No rashes or lesions

## 2021-05-29 NOTE — H&P ADULT - PROBLEM SELECTOR PLAN 2
Pt presented with leukocytosis of WBC 18.49k and tachycardia to . No obvious source of infection. No fevers.  - UA neg. COVID-19 neg. CXR clear.  - possibly in setting of ?dehydration vs infection of unclear source  - monitor off abx for now  - check BCx  - continue to monitor WBC and vital signs for improvement Pt presented with leukocytosis of WBC 18.49k and tachycardia to . Lactate elevated to 2.1 on presentation. No obvious source of infection. No fevers/chills noted.  - UA neg. COVID-19 neg. CXR clear.  - possibly in setting of ?dehydration vs infection of unclear source vs ?malignancy  - monitor off abx for now  - check BCx  - continue to monitor WBC and vital signs for improvement Pt presented with leukocytosis of WBC 18.49k and tachycardia to . Lactate elevated to 2.1 on presentation. No obvious source of infection. No fevers/chills noted.  - UA neg. COVID-19 neg. CXR clear.  - possibly in setting of ?dehydration vs infection of unclear source vs ?malignancy  - monitor off abx for now  - obtain CT A/P w/ IV contrast  - check BCx  - continue to monitor WBC and vital signs for improvement Pt presented with leukocytosis of WBC 18.49k and tachycardia to . Lactate elevated to 2.1 on presentation. No obvious source of infection. No fevers/chills noted.  - UA neg. COVID-19 neg. CXR clear.  - possibly in setting of ?dehydration vs infection of unclear source vs ?malignancy  - monitor off abx for now  - obtain CT A/P w/ IV contrast  - f/u BCx  - monitor BMs given report of soft stools; if diarrhea, send stool studies  - continue to monitor WBC and vital signs for improvement

## 2021-05-29 NOTE — H&P ADULT - ASSESSMENT
79 yo M w/ hx HTN and DM, presenting after fall at home in setting of generalized weakness. Found to be orthostatic and with leukocytosis. 79 yo M w/ hx HTN and DM, presenting after fall at home in setting of generalized weakness. Found to be orthostatic and with leukocytosis of unclear etiology. 81 yo M Mandarin-speaking w/ hx HTN and DM, presenting after fall at home in setting of generalized weakness. Found to be orthostatic and with leukocytosis of unclear etiology. 79 yo M Mandarin-speaking w/ hx HTN and DM2, presenting with fall, generalized weakness, found to be orthostatic and with leukocytosis of unclear etiology.

## 2021-05-29 NOTE — H&P ADULT - PROBLEM SELECTOR PLAN 3
Pt presented with urinary retention w/ reports of previous episodes in the past.  - s/p straight cath in ED with 650cc output  - likely 2/2 BPH  - will hold off on starting tamsulosin for now in setting of soft BPs  - output follow-up with PCP Pt presented with urinary retention w/ reports of previous episodes in the past.  - s/p straight cath in ED with 650cc output  - likely 2/2 BPH  - monitor I/Os  - will hold off on starting tamsulosin for now in setting of soft BPs  - output follow-up with PCP Pt presented with urinary retention w/ reports of previous episodes in the past.  - s/p straight cath in ED with 650cc output (after he could not urinate on attempt)  - likely 2/2 BPH  - monitor I/Os  - will hold off on starting tamsulosin for now in setting of soft BPs  - output follow-up with PCP

## 2021-05-29 NOTE — H&P ADULT - PROBLEM SELECTOR PLAN 6
- DVT ppx: low IMPROVE score, monitor off pharmacological ppx for now, encourage ambulation  - Diet: DASH/TLC, CC diet  - Dispo: likely home once medically optimized - DVT ppx: SCDs  - Diet: DASH/TLC, CC diet  - Dispo: likely home once medically optimized; f/u PT eval - DVT ppx: SCDs, fall precautions  - Diet: DASH/TLC, CC diet  - Dispo: likely home once medically optimized; f/u PT eval

## 2021-05-29 NOTE — DISCHARGE NOTE PROVIDER - NSDCMRMEDTOKEN_GEN_ALL_CORE_FT
Tradjenta 5 mg oral tablet: tab(s) orally once a day   metFORMIN:   Physical Therapy:   tamsulosin 0.4 mg oral capsule: 1 cap(s) orally once a day  Tradjenta 5 mg oral tablet: tab(s) orally once a day   metFORMIN: 850 milligram(s) orally once a day  Physical Therapy:   tamsulosin 0.4 mg oral capsule: 1 cap(s) orally once a day  Tradjenta 5 mg oral tablet: tab(s) orally once a day

## 2021-05-29 NOTE — DISCHARGE NOTE PROVIDER - HOSPITAL COURSE
79 yo M Mandarin-speaking w/ hx HTN and DM2, presenting after fall at home in setting of generalized weakness for 1 day. Pt reports that he woke up today and was walking without problem but endorses feeling weak/tired and laid down on couch to rest. When he tried to get up afterwards to go to the bathroom, he felt weak and fell. Denies LOC or head trauma. He could not stand back up and had to push with his legs while on the floor to go to the bed after which his wife called EMS. Pt states he was told at the time that he had low blood pressure. Denies f/c/n/v, CP, palpitations, SOB, cough, abdominal pain, dysuria, hematuria, hematochezia, melena, constipation, dizziness, weight loss, sick contacts, recent travel. Notes an episode of soft stools earlier in the day. Had an episode of urinary retention while in the hospital today and endorses having a few episodes in the past requiring straight cath. Of note, pt endorses eating/drinking as he normally does, but diet mainly consists of Chinese plain bread and noodles without much else (?"tea and toast diet").    In ED: Tmax 98.5F rectal, HR 90-100s, -110s, RR 18-21, sating % on RA. Positive orthostatics (sitting: /40, HR 94; standing BP of 74/49, ; with reports of dizziness on standing). Labs notable for WBC 18k and lactate 2.1. UA neg. COVID-19 neg. CXR clear. s/p 1L NS bolus. Admitted to Medicine for further management.      Pt received IVF. He was straight cathed X1 for urinary retention. Repeat orthostatics showed __________. Blood cultures were sent. Leukocytosis improved. He had no other signs of infection. Enalapril was held. 79 yo M Mandarin-speaking w/ hx HTN and DM2, presenting after fall at home in setting of generalized weakness for 1 day. Pt reports that he woke up today and was walking without problem but endorses feeling weak/tired and laid down on couch to rest. When he tried to get up afterwards to go to the bathroom, he felt weak and fell. Denies LOC or head trauma. He could not stand back up and had to push with his legs while on the floor to go to the bed after which his wife called EMS. Pt states he was told at the time that he had low blood pressure. Denies f/c/n/v, CP, palpitations, SOB, cough, abdominal pain, dysuria, hematuria, hematochezia, melena, constipation, dizziness, weight loss, sick contacts, recent travel. Notes an episode of soft stools earlier in the day. Had an episode of urinary retention while in the hospital today and endorses having a few episodes in the past requiring straight cath. Of note, pt endorses eating/drinking as he normally does, but diet mainly consists of Chinese plain bread and noodles without much else (?"tea and toast diet").    In ED: Tmax 98.5F rectal, HR 90-100s, -110s, RR 18-21, sating % on RA. Positive orthostatics (sitting: /40, HR 94; standing BP of 74/49, ; with reports of dizziness on standing). Labs notable for WBC 18k and lactate 2.1. UA neg. COVID-19 neg. CXR clear. s/p 1L NS bolus. Admitted to Medicine for further management.      Pt received IVF. He was straight cathed X1 for urinary retention. Repeat orthostatics improved. Blood cultures were sent. Leukocytosis improved. He had no other signs of infection. Enalapril was held.

## 2021-05-29 NOTE — H&P ADULT - NSHPLABSRESULTS_GEN_ALL_CORE
LABS:                        13.0   18.49 )-----------( 221      ( 28 May 2021 16:01 )             38.2         134<L>  |  99  |  19  ----------------------------<  120<H>  4.9   |  19<L>  |  0.88    Ca    9.3      28 May 2021 16:01    TPro  7.9  /  Alb  4.3  /  TBili  0.4  /  DBili  x   /  AST  32  /  ALT  16  /  AlkPhos  95            Urinalysis Basic - ( 28 May 2021 22:05 )    Color: Light Yellow / Appearance: Clear / S.011 / pH: x  Gluc: x / Ketone: Negative  / Bili: Negative / Urobili: Negative   Blood: x / Protein: Trace / Nitrite: Negative   Leuk Esterase: Negative / RBC: 5 /hpf / WBC 1 /HPF   Sq Epi: x / Non Sq Epi: 0 /hpf / Bacteria: Negative          RADIOLOGY & ADDITIONAL TESTS:    < from: Xray Chest 1 View- PORTABLE-Urgent (Xray Chest 1 View- PORTABLE-Urgent .) (21 @ 18:39) >  IMPRESSION:  Clear lungs.  < end of copied text >    EKG (): sinus with 1st degree AV block w/ PACs and J waves Personally reviewed imaging, labs, ekg.    LABS:                        13.0   18.49 )-----------( 221      ( 28 May 2021 16:01 )             38.2         134<L>  |  99  |  19  ----------------------------<  120<H>  4.9   |  19<L>  |  0.88    Ca    9.3      28 May 2021 16:01    TPro  7.9  /  Alb  4.3  /  TBili  0.4  /  DBili  x   /  AST  32  /  ALT  16  /  AlkPhos  95            Urinalysis Basic - ( 28 May 2021 22:05 )    Color: Light Yellow / Appearance: Clear / S.011 / pH: x  Gluc: x / Ketone: Negative  / Bili: Negative / Urobili: Negative   Blood: x / Protein: Trace / Nitrite: Negative   Leuk Esterase: Negative / RBC: 5 /hpf / WBC 1 /HPF   Sq Epi: x / Non Sq Epi: 0 /hpf / Bacteria: Negative          RADIOLOGY & ADDITIONAL TESTS:    < from: Xray Chest 1 View- PORTABLE-Urgent (Xray Chest 1 View- PORTABLE-Urgent .) (21 @ 18:39) >  IMPRESSION:  Clear lungs.  < end of copied text >    EKG (): sinus with 1st degree AV block w/ PACs and J waves

## 2021-05-29 NOTE — H&P ADULT - ATTENDING COMMENTS
81 yo M bilingual Mandarin-speaking w/ hx HTN and DM2, presenting with fall, generalized weakness, found to be orthostatic and with leukocytosis of unclear etiology. Trend WBC. CT a/p to eval for occult infection. Observe off antibiotics. PT eval. IVF.

## 2021-05-29 NOTE — H&P ADULT - HISTORY OF PRESENT ILLNESS
81 yo M w/ hx HTN and DM, presenting after fall at home in setting of generalized weakness. Pt reports that he woke up today and was walking without problem but endorses feeling weak/tired and laid down on couch to rest. When he tried to get up afterwards to go to the bathroom, he felt weak and fell. Denies LOC or head trauma. He could not stand back up and had to push with his legs while on the floor to go to the bed after which his wife called EMS. Pt states he was told at the time that he had low blood pressure. Denies f/c/n/v, CP, palpitations, SOB, cough, abdominal pain, dysuria, hematuria, hematochezia, melena, dizziness, weight loss, sick contacts, recent travel. Had an episode of urinary retention while in the hospital today and endorses having a few episodes in the past requiring straight cath. Endorses eating/drinking as he normally does, but diet mainly consists of Chinese plain bread and noodles without much else (?"tea and toast diet").    In ED: Tmax 98.5F rectal, HR 90-100s, -110s, RR 18-21, sating % on RA. Positive orthostatics (sitting: /40, HR 94; standing BP of 74/49, ; with reports of dizziness on standing). Labs notable for WBC 18k and lactate 2.1. UA neg. COVID-19 neg. CXR clear. s/p 1L NS bolus. Admitted to Medicine for further management.   81 yo M w/ hx HTN and DM, presenting after fall at home in setting of generalized weakness. Pt reports that he woke up today and was walking without problem but endorses feeling weak/tired and laid down on couch to rest. When he tried to get up afterwards to go to the bathroom, he felt weak and fell. Denies LOC or head trauma. He could not stand back up and had to push with his legs while on the floor to go to the bed after which his wife called EMS. Pt states he was told at the time that he had low blood pressure. Denies f/c/n/v, CP, palpitations, SOB, cough, abdominal pain, dysuria, hematuria, hematochezia, melena, dizziness, weight loss, sick contacts, recent travel. Had an episode of urinary retention while in the hospital today and endorses having a few episodes in the past requiring straight cath. Of note, pt endorses eating/drinking as he normally does, but diet mainly consists of Chinese plain bread and noodles without much else (?"tea and toast diet").    In ED: Tmax 98.5F rectal, HR 90-100s, -110s, RR 18-21, sating % on RA. Positive orthostatics (sitting: /40, HR 94; standing BP of 74/49, ; with reports of dizziness on standing). Labs notable for WBC 18k and lactate 2.1. UA neg. COVID-19 neg. CXR clear. s/p 1L NS bolus. Admitted to Medicine for further management.   79 yo M Mandarin-speaking w/ hx HTN and DM, presenting after fall at home in setting of generalized weakness. Pt reports that he woke up today and was walking without problem but endorses feeling weak/tired and laid down on couch to rest. When he tried to get up afterwards to go to the bathroom, he felt weak and fell. Denies LOC or head trauma. He could not stand back up and had to push with his legs while on the floor to go to the bed after which his wife called EMS. Pt states he was told at the time that he had low blood pressure. Denies f/c/n/v, CP, palpitations, SOB, cough, abdominal pain, dysuria, hematuria, hematochezia, melena, dizziness, weight loss, sick contacts, recent travel. Had an episode of urinary retention while in the hospital today and endorses having a few episodes in the past requiring straight cath. Of note, pt endorses eating/drinking as he normally does, but diet mainly consists of Chinese plain bread and noodles without much else (?"tea and toast diet").    In ED: Tmax 98.5F rectal, HR 90-100s, -110s, RR 18-21, sating % on RA. Positive orthostatics (sitting: /40, HR 94; standing BP of 74/49, ; with reports of dizziness on standing). Labs notable for WBC 18k and lactate 2.1. UA neg. COVID-19 neg. CXR clear. s/p 1L NS bolus. Admitted to Medicine for further management.   81 yo M Mandarin-speaking w/ hx HTN and DM, presenting after fall at home in setting of generalized weakness. Pt reports that he woke up today and was walking without problem but endorses feeling weak/tired and laid down on couch to rest. When he tried to get up afterwards to go to the bathroom, he felt weak and fell. Denies LOC or head trauma. He could not stand back up and had to push with his legs while on the floor to go to the bed after which his wife called EMS. Pt states he was told at the time that he had low blood pressure. Denies f/c/n/v, CP, palpitations, SOB, cough, abdominal pain, dysuria, hematuria, hematochezia, melena, constipation, dizziness, weight loss, sick contacts, recent travel. Notes an episode of soft stools earlier in the day. Had an episode of urinary retention while in the hospital today and endorses having a few episodes in the past requiring straight cath. Of note, pt endorses eating/drinking as he normally does, but diet mainly consists of Chinese plain bread and noodles without much else (?"tea and toast diet").    In ED: Tmax 98.5F rectal, HR 90-100s, -110s, RR 18-21, sating % on RA. Positive orthostatics (sitting: /40, HR 94; standing BP of 74/49, ; with reports of dizziness on standing). Labs notable for WBC 18k and lactate 2.1. UA neg. COVID-19 neg. CXR clear. s/p 1L NS bolus. Admitted to Medicine for further management.   81 yo M Mandarin-speaking w/ hx HTN and DM2, presenting after fall at home in setting of generalized weakness for 1 day. Pt reports that he woke up today and was walking without problem but endorses feeling weak/tired and laid down on couch to rest. When he tried to get up afterwards to go to the bathroom, he felt weak and fell. Denies LOC or head trauma. He could not stand back up and had to push with his legs while on the floor to go to the bed after which his wife called EMS. Pt states he was told at the time that he had low blood pressure. Denies f/c/n/v, CP, palpitations, SOB, cough, abdominal pain, dysuria, hematuria, hematochezia, melena, constipation, dizziness, weight loss, sick contacts, recent travel. Notes an episode of soft stools earlier in the day. Had an episode of urinary retention while in the hospital today and endorses having a few episodes in the past requiring straight cath. Of note, pt endorses eating/drinking as he normally does, but diet mainly consists of Chinese plain bread and noodles without much else (?"tea and toast diet").    In ED: Tmax 98.5F rectal, HR 90-100s, -110s, RR 18-21, sating % on RA. Positive orthostatics (sitting: /40, HR 94; standing BP of 74/49, ; with reports of dizziness on standing). Labs notable for WBC 18k and lactate 2.1. UA neg. COVID-19 neg. CXR clear. s/p 1L NS bolus. Admitted to Medicine for further management.

## 2021-05-29 NOTE — DISCHARGE NOTE PROVIDER - NSDCCPCAREPLAN_GEN_ALL_CORE_FT
PRINCIPAL DISCHARGE DIAGNOSIS  Diagnosis: Weakness  Assessment and Plan of Treatment: You came in with weakness and fall. Your blood pressure  was noted to be low when you stood up. We held your enalapril for this reason and gave you IV fluids. You should follow up with your PCP to restart this medication. You should ensure you eat well and remain hydrated.       PRINCIPAL DISCHARGE DIAGNOSIS  Diagnosis: Weakness  Assessment and Plan of Treatment: You came in with weakness and fall. Your blood pressure  was noted to be low when you stood up. We held your enalapril for this reason and gave you IV fluids. You should follow up with your PCP about when to restart this medication. You should ensure you eat well and remain hydrated. You may also buy compression stockings as well which can help with this problem. Please go to outpatient PT to rehab your strength.      SECONDARY DISCHARGE DIAGNOSES  Diagnosis: Leukocytosis  Assessment and Plan of Treatment: Your white blood cell count was high. Please followup with  your primary care provider to make sure this continues to improve or needs further workup.    Diagnosis: Urinary retention  Assessment and Plan of Treatment: Please continue to take your flomax once daily at night.     PRINCIPAL DISCHARGE DIAGNOSIS  Diagnosis: Weakness  Assessment and Plan of Treatment: You came in with weakness and fall. Your blood pressure  was noted to be low when you stood up. We held your enalapril for this reason and gave you IV fluids. You should follow up with your PCP about when to restart this medication. You should ensure you eat well and remain hydrated. You may also buy compression stockings as well which can help with this problem. Please go to outpatient PT to rehab your strength.      SECONDARY DISCHARGE DIAGNOSES  Diagnosis: Leukocytosis  Assessment and Plan of Treatment: Your white blood cell count was high. Please followup with  your primary care provider to make sure this continues to improve or needs further workup.    Diagnosis: DM (diabetes mellitus)  Assessment and Plan of Treatment: Please continue your diabetes medications and followup with your primary care provider.    Diagnosis: Urinary retention  Assessment and Plan of Treatment: Please continue to take your flomax once daily at night.

## 2021-05-29 NOTE — PROGRESS NOTE ADULT - SUBJECTIVE AND OBJECTIVE BOX
PROGRESS NOTE:     CONTACT INFO:  Rhiannon Melendez MD   PGY-1  Pager: [phone] [facility]    Patient is a 80y old  Male who presents with a chief complaint of weakness/fall (29 May 2021 04:58)      SUBJECTIVE / OVERNIGHT EVENTS:  No acute events overnight. Patient seen and evaluated at bedside. No fever/chills.  Denies SOB at rest, chest pain, palpitations, abdominal pain, nausea/vomiting.     ADDITIONAL REVIEW OF SYSTEMS:    Neg    MEDICATIONS  (STANDING):  dextrose 40% Gel 15 Gram(s) Oral once  dextrose 5%. 1000 milliLiter(s) (50 mL/Hr) IV Continuous <Continuous>  dextrose 5%. 1000 milliLiter(s) (100 mL/Hr) IV Continuous <Continuous>  dextrose 50% Injectable 25 Gram(s) IV Push once  dextrose 50% Injectable 12.5 Gram(s) IV Push once  dextrose 50% Injectable 25 Gram(s) IV Push once  glucagon  Injectable 1 milliGRAM(s) IntraMuscular once  insulin lispro (ADMELOG) corrective regimen sliding scale   SubCutaneous three times a day before meals  insulin lispro (ADMELOG) corrective regimen sliding scale   SubCutaneous at bedtime  lactated ringers. 1000 milliLiter(s) (100 mL/Hr) IV Continuous <Continuous>    MEDICATIONS  (PRN):      CAPILLARY BLOOD GLUCOSE      POCT Blood Glucose.: 102 mg/dL (29 May 2021 08:48)  POCT Blood Glucose.: 122 mg/dL (28 May 2021 15:18)    I&O's Summary      PHYSICAL EXAM:  Vital Signs Last 24 Hrs  T(C): 36.7 (29 May 2021 05:21), Max: 36.9 (28 May 2021 16:00)  T(F): 98.1 (29 May 2021 05:21), Max: 98.5 (28 May 2021 16:00)  HR: 104 (29 May 2021 05:21) (90 - 104)  BP: 149/69 (29 May 2021 05:21) (100/60 - 149/69)  BP(mean): 73 (28 May 2021 23:00) (73 - 73)  RR: 18 (29 May 2021 05:21) (17 - 21)  SpO2: 98% (29 May 2021 05:21) (98% - 100%)    GENERAL: NAD, elderly man resting in bed  HEAD: Atraumatic, Normocephalic  EYES: PERRL, sclera clear  CHEST/LUNG: Clear to auscultation bilaterally; No wheezes or rales  HEART: Regular rate and rhythm; No murmurs, rubs, or gallops  ABDOMEN: Soft, Nontender, Nondistended; Bowel sounds present  EXTREMITIES:  2+ Peripheral Pulses, No clubbing, cyanosis, or edema  PSYCH: AAOx3 (oriented to self, place, and year)  NEUROLOGY: non-focal, able to ambulate without difficulty  SKIN: No rashes or lesions    LABS:                        14.3   12.73 )-----------( 248      ( 29 May 2021 07:21 )             42.3         138  |  101  |  16  ----------------------------<  100<H>  3.5   |  20<L>  |  0.90    Ca    9.4      29 May 2021 07:21  Phos  3.2       Mg     1.9         TPro  8.0  /  Alb  4.5  /  TBili  0.9  /  DBili  x   /  AST  19  /  ALT  13  /  AlkPhos  92        Lactate, Blood: 2.5 mmol/L (21 @ 07:21)       Trop: 22    Urinalysis Basic - ( 28 May 2021 22:05 )    Color: Light Yellow / Appearance: Clear / S.011 / pH: x  Gluc: x / Ketone: Negative  / Bili: Negative / Urobili: Negative   Blood: x / Protein: Trace / Nitrite: Negative   Leuk Esterase: Negative / RBC: 5 /hpf / WBC 1 /HPF   Sq Epi: x / Non Sq Epi: 0 /hpf / Bacteria: Negative          RADIOLOGY & ADDITIONAL TESTS:    CXR: Clear lungs

## 2021-05-29 NOTE — H&P ADULT - PROBLEM SELECTOR PLAN 5
Hx of HTN  - hold home enalapril 10mg PO daily for now given soft BPs  - continue to monitor BP, resume if BP remains improved Hx of HTN  - hold home enalapril 10mg PO daily for now given recent soft BPs  - continue to monitor BP, resume if BP remains improved

## 2021-05-30 ENCOUNTER — TRANSCRIPTION ENCOUNTER (OUTPATIENT)
Age: 81
End: 2021-05-30

## 2021-05-30 VITALS
HEART RATE: 84 BPM | SYSTOLIC BLOOD PRESSURE: 138 MMHG | OXYGEN SATURATION: 98 % | TEMPERATURE: 98 F | DIASTOLIC BLOOD PRESSURE: 76 MMHG | RESPIRATION RATE: 18 BRPM

## 2021-05-30 LAB
A1C WITH ESTIMATED AVERAGE GLUCOSE RESULT: 6.6 % — HIGH (ref 4–5.6)
ANION GAP SERPL CALC-SCNC: 12 MMOL/L — SIGNIFICANT CHANGE UP (ref 5–17)
BUN SERPL-MCNC: 16 MG/DL — SIGNIFICANT CHANGE UP (ref 7–23)
CALCIUM SERPL-MCNC: 9 MG/DL — SIGNIFICANT CHANGE UP (ref 8.4–10.5)
CHLORIDE SERPL-SCNC: 102 MMOL/L — SIGNIFICANT CHANGE UP (ref 96–108)
CO2 SERPL-SCNC: 22 MMOL/L — SIGNIFICANT CHANGE UP (ref 22–31)
COVID-19 SPIKE DOMAIN AB INTERP: POSITIVE
COVID-19 SPIKE DOMAIN ANTIBODY RESULT: >250 U/ML — HIGH
CREAT SERPL-MCNC: 0.7 MG/DL — SIGNIFICANT CHANGE UP (ref 0.5–1.3)
ESTIMATED AVERAGE GLUCOSE: 143 MG/DL — HIGH (ref 68–114)
GLUCOSE SERPL-MCNC: 109 MG/DL — HIGH (ref 70–99)
HCT VFR BLD CALC: 34.6 % — LOW (ref 39–50)
HCT VFR BLD CALC: 36.2 % — LOW (ref 39–50)
HGB BLD-MCNC: 12.1 G/DL — LOW (ref 13–17)
HGB BLD-MCNC: 12.6 G/DL — LOW (ref 13–17)
MAGNESIUM SERPL-MCNC: 1.6 MG/DL — SIGNIFICANT CHANGE UP (ref 1.6–2.6)
MCHC RBC-ENTMCNC: 30.7 PG — SIGNIFICANT CHANGE UP (ref 27–34)
MCHC RBC-ENTMCNC: 30.8 PG — SIGNIFICANT CHANGE UP (ref 27–34)
MCHC RBC-ENTMCNC: 34.8 GM/DL — SIGNIFICANT CHANGE UP (ref 32–36)
MCHC RBC-ENTMCNC: 35 GM/DL — SIGNIFICANT CHANGE UP (ref 32–36)
MCV RBC AUTO: 88 FL — SIGNIFICANT CHANGE UP (ref 80–100)
MCV RBC AUTO: 88.3 FL — SIGNIFICANT CHANGE UP (ref 80–100)
NRBC # BLD: 0 /100 WBCS — SIGNIFICANT CHANGE UP (ref 0–0)
NRBC # BLD: 0 /100 WBCS — SIGNIFICANT CHANGE UP (ref 0–0)
PHOSPHATE SERPL-MCNC: 2.7 MG/DL — SIGNIFICANT CHANGE UP (ref 2.5–4.5)
PLATELET # BLD AUTO: 203 K/UL — SIGNIFICANT CHANGE UP (ref 150–400)
PLATELET # BLD AUTO: 206 K/UL — SIGNIFICANT CHANGE UP (ref 150–400)
POTASSIUM SERPL-MCNC: 3.3 MMOL/L — LOW (ref 3.5–5.3)
POTASSIUM SERPL-SCNC: 3.3 MMOL/L — LOW (ref 3.5–5.3)
RBC # BLD: 3.93 M/UL — LOW (ref 4.2–5.8)
RBC # BLD: 4.1 M/UL — LOW (ref 4.2–5.8)
RBC # FLD: 12.5 % — SIGNIFICANT CHANGE UP (ref 10.3–14.5)
RBC # FLD: 12.5 % — SIGNIFICANT CHANGE UP (ref 10.3–14.5)
SARS-COV-2 IGG+IGM SERPL QL IA: >250 U/ML — HIGH
SARS-COV-2 IGG+IGM SERPL QL IA: POSITIVE
SODIUM SERPL-SCNC: 136 MMOL/L — SIGNIFICANT CHANGE UP (ref 135–145)
WBC # BLD: 11.9 K/UL — HIGH (ref 3.8–10.5)
WBC # BLD: 12.46 K/UL — HIGH (ref 3.8–10.5)
WBC # FLD AUTO: 11.9 K/UL — HIGH (ref 3.8–10.5)
WBC # FLD AUTO: 12.46 K/UL — HIGH (ref 3.8–10.5)

## 2021-05-30 PROCEDURE — 99239 HOSP IP/OBS DSCHRG MGMT >30: CPT

## 2021-05-30 RX ORDER — POTASSIUM CHLORIDE 20 MEQ
40 PACKET (EA) ORAL ONCE
Refills: 0 | Status: COMPLETED | OUTPATIENT
Start: 2021-05-30 | End: 2021-05-30

## 2021-05-30 RX ADMIN — Medication 40 MILLIEQUIVALENT(S): at 13:41

## 2021-05-30 RX ADMIN — SODIUM CHLORIDE 100 MILLILITER(S): 9 INJECTION, SOLUTION INTRAVENOUS at 13:41

## 2021-05-30 RX ADMIN — SODIUM CHLORIDE 100 MILLILITER(S): 9 INJECTION, SOLUTION INTRAVENOUS at 01:55

## 2021-05-30 NOTE — PROGRESS NOTE ADULT - ASSESSMENT
79 yo M Mandarin-speaking w/ hx HTN and DM2, presenting with fall, generalized weakness, found to be orthostatic and with leukocytosis of unclear etiology.
81 yo M Mandarin-speaking w/ hx HTN and DM2, presenting with fall, generalized weakness, found to be orthostatic and with leukocytosis of unclear etiology.

## 2021-05-30 NOTE — PROGRESS NOTE ADULT - PROBLEM SELECTOR PLAN 3
Pt presented with urinary retention w/ reports of previous episodes in the past.  - s/p straight cath in ED with 650cc output (after he could not urinate on attempt)  - likely 2/2 BPH  - monitor I/Os  - will hold off on starting tamsulosin for now in setting of soft BPs  - output follow-up with PCP
Pt presented with urinary retention w/ reports of previous episodes in the past.  - s/p straight cath in ED with 650cc output (after he could not urinate on attempt)  - likely 2/2 BPH  - monitor I/Os  - will hold off on starting tamsulosin for now in setting of soft BPs  - output follow-up with PCP

## 2021-05-30 NOTE — PROGRESS NOTE ADULT - PROBLEM SELECTOR PLAN 2
Pt presented with leukocytosis of WBC 18.49k and tachycardia to . Lactate elevated to 2.1 on presentation. No obvious source of infection. No fevers/chills noted.   - UA neg. COVID-19 neg. CXR clear.  - possibly in setting of ?dehydration vs infection of unclear source vs ?malignancy  - monitor off abx for now  - f/u BCx  - monitor BMs given report of soft stools; if diarrhea, send stool studies  - continue to monitor WBC and vital signs for improvement

## 2021-05-30 NOTE — PHYSICAL THERAPY INITIAL EVALUATION ADULT - DISCHARGE DISPOSITION, PT EVAL
home w/ family and recommended outpatient PT to improve his strength, balance and endurance. No DMEs recommended./outpatient PT home w/ family and recommended outpatient PT to improve his strength, balance and endurance./outpatient PT

## 2021-05-30 NOTE — PHYSICAL THERAPY INITIAL EVALUATION ADULT - ADDITIONAL COMMENTS
As per the pt, he lives in an appt with his wife, +elevator, PTA, Pt was I in ADls/functional ambulation w/o using any AD. -DMEs at home.

## 2021-05-30 NOTE — PROGRESS NOTE ADULT - PROBLEM SELECTOR PLAN 6
- DVT ppx: SCDs, fall precautions  - Diet: DASH/TLC, CC diet  - Dispo: likely home once medically optimized; f/u PT eval
- DVT ppx: SCDs, fall precautions  - Diet: DASH/TLC, CC diet  - Dispo: likely home once medically optimized; f/u PT eval

## 2021-05-30 NOTE — PHYSICAL THERAPY INITIAL EVALUATION ADULT - STRENGTHENING, PT EVAL
Pt will demonstrate good strength in BLE/BUEs to improve limb stability during ADLs/mobility in 2 weeks

## 2021-05-30 NOTE — PROGRESS NOTE ADULT - ATTENDING COMMENTS
Leukocytosis- improved off antibiotics- cultures negative to date   Orthostasis- resolved s/p IVF  Urinary retention- resume tamsulosin   PT eval- outpatient PT  D/C home today- d/w patient's son-in-law Dr. Iam Ly- 45 minutes spent discharging the patient
Leukocytosis- improving, observing off antibiotics- cultures negative to date   Orthostasis- s/p IVF in ED- repeat orthostatics  Urinary retention- monitor output, straight cath prn  PT eval

## 2021-05-30 NOTE — PHYSICAL THERAPY INITIAL EVALUATION ADULT - PERTINENT HX OF CURRENT PROBLEM, REHAB EVAL
81 yo M Mandarin-speaking w/ hx HTN and DM2, presenting after fall at home in setting of generalized weakness for 1 day. Pt reports that he woke up today and was walking without problem but endorses feeling weak/tired and laid down on couch to rest. When he tried to get up afterwards to go to the bathroom, he felt weak and fell. Denies LOC or head trauma. He could not stand back up and had to push with his legs while on the floor to go to the bed after which his wife called EMS. lab dec h/h

## 2021-05-30 NOTE — PROGRESS NOTE ADULT - SUBJECTIVE AND OBJECTIVE BOX
Dr. Leonides Stephenson   Internal Medicine PGY-3  Pager [phone] ([facility])/[phone] ([facility])    Patient is a 80y old  Male who presents with a chief complaint of weakness/fall (29 May 2021 12:01)      SUBJECTIVE / OVERNIGHT EVENTS:  GRACIELA ON. No fevers, sweats, chills, CP, SOB, or abdominal pain.    MEDICATIONS  (STANDING):  dextrose 40% Gel 15 Gram(s) Oral once  dextrose 5%. 1000 milliLiter(s) (50 mL/Hr) IV Continuous <Continuous>  dextrose 5%. 1000 milliLiter(s) (100 mL/Hr) IV Continuous <Continuous>  dextrose 50% Injectable 25 Gram(s) IV Push once  dextrose 50% Injectable 12.5 Gram(s) IV Push once  dextrose 50% Injectable 25 Gram(s) IV Push once  glucagon  Injectable 1 milliGRAM(s) IntraMuscular once  insulin lispro (ADMELOG) corrective regimen sliding scale   SubCutaneous three times a day before meals  insulin lispro (ADMELOG) corrective regimen sliding scale   SubCutaneous at bedtime  lactated ringers. 1000 milliLiter(s) (100 mL/Hr) IV Continuous <Continuous>    MEDICATIONS  (PRN):      Vital Signs Last 24 Hrs  T(C): 36.7 (30 May 2021 04:42), Max: 36.9 (29 May 2021 12:14)  T(F): 98.1 (30 May 2021 04:42), Max: 98.5 (29 May 2021 12:14)  HR: 86 (30 May 2021 04:42) (82 - 96)  BP: 134/75 (30 May 2021 04:42) (117/78 - 134/75)  BP(mean): --  RR: 18 (30 May 2021 04:42) (18 - 18)  SpO2: 96% (30 May 2021 04:42) (96% - 98%)  CAPILLARY BLOOD GLUCOSE      POCT Blood Glucose.: 137 mg/dL (29 May 2021 22:15)  POCT Blood Glucose.: 115 mg/dL (29 May 2021 16:37)  POCT Blood Glucose.: 102 mg/dL (29 May 2021 08:48)    I&O's Summary    29 May 2021 07:01  -  30 May 2021 07:00  --------------------------------------------------------  IN: 240 mL / OUT: 400 mL / NET: -160 mL        PHYSICAL EXAM:  GENERAL: NAD, well-developed  HEAD:  Atraumatic, Normocephalic  EYES: EOMI, PERRLA, conjunctiva and sclera clear  NECK: Supple, No JVD  CHEST/LUNG: Clear to auscultation bilaterally; No wheeze  HEART: Regular rate and rhythm; No murmurs, rubs, or gallops  ABDOMEN: Soft, Nontender, Nondistended; Bowel sounds present  EXTREMITIES:  2+ Peripheral Pulses, No clubbing, cyanosis, or edema  PSYCH: AAOx3  NEUROLOGY: non-focal  SKIN: No rashes or lesions    LABS:                        14.3   12.73 )-----------( 248      ( 29 May 2021 07:21 )             42.3         138  |  101  |  16  ----------------------------<  100<H>  3.5   |  20<L>  |  0.90    Ca    9.4      29 May 2021 07:21  Phos  3.2       Mg     1.9         TPro  8.0  /  Alb  4.5  /  TBili  0.9  /  DBili  x   /  AST  19  /  ALT  13  /  AlkPhos  92            Urinalysis Basic - ( 28 May 2021 22:05 )    Color: Light Yellow / Appearance: Clear / S.011 / pH: x  Gluc: x / Ketone: Negative  / Bili: Negative / Urobili: Negative   Blood: x / Protein: Trace / Nitrite: Negative   Leuk Esterase: Negative / RBC: 5 /hpf / WBC 1 /HPF   Sq Epi: x / Non Sq Epi: 0 /hpf / Bacteria: Negative        RADIOLOGY & ADDITIONAL TESTS:    Imaging Personally Reviewed:    Consultant(s) Notes Reviewed:      Care Discussed with Consultants/Other Providers:   Dr. Leonides Stephenson   Internal Medicine PGY-3  Pager [phone] ([facility])/[phone] ([facility])    Patient is a 80y old  Male who presents with a chief complaint of weakness/fall (29 May 2021 12:01)      SUBJECTIVE / OVERNIGHT EVENTS:  GRACIELA ON. No fevers, sweats, chills, CP, SOB, or abdominal pain. Hgb decreased to 12.1.    MEDICATIONS  (STANDING):  dextrose 40% Gel 15 Gram(s) Oral once  dextrose 5%. 1000 milliLiter(s) (50 mL/Hr) IV Continuous <Continuous>  dextrose 5%. 1000 milliLiter(s) (100 mL/Hr) IV Continuous <Continuous>  dextrose 50% Injectable 25 Gram(s) IV Push once  dextrose 50% Injectable 12.5 Gram(s) IV Push once  dextrose 50% Injectable 25 Gram(s) IV Push once  glucagon  Injectable 1 milliGRAM(s) IntraMuscular once  insulin lispro (ADMELOG) corrective regimen sliding scale   SubCutaneous three times a day before meals  insulin lispro (ADMELOG) corrective regimen sliding scale   SubCutaneous at bedtime  lactated ringers. 1000 milliLiter(s) (100 mL/Hr) IV Continuous <Continuous>    MEDICATIONS  (PRN):      Vital Signs Last 24 Hrs  T(C): 36.7 (30 May 2021 04:42), Max: 36.9 (29 May 2021 12:14)  T(F): 98.1 (30 May 2021 04:42), Max: 98.5 (29 May 2021 12:14)  HR: 86 (30 May 2021 04:42) (82 - 96)  BP: 134/75 (30 May 2021 04:42) (117/78 - 134/75)  BP(mean): --  RR: 18 (30 May 2021 04:42) (18 - 18)  SpO2: 96% (30 May 2021 04:42) (96% - 98%)  CAPILLARY BLOOD GLUCOSE      POCT Blood Glucose.: 137 mg/dL (29 May 2021 22:15)  POCT Blood Glucose.: 115 mg/dL (29 May 2021 16:37)  POCT Blood Glucose.: 102 mg/dL (29 May 2021 08:48)    I&O's Summary    29 May 2021 07:01  -  30 May 2021 07:00  --------------------------------------------------------  IN: 240 mL / OUT: 400 mL / NET: -160 mL        PHYSICAL EXAM:  GENERAL: NAD, well-developed  HEAD:  Atraumatic, Normocephalic  EYES: EOMI, PERRLA, conjunctiva and sclera clear  NECK: Supple, No JVD  CHEST/LUNG: Clear to auscultation bilaterally; No wheeze  HEART: Regular rate and rhythm; No murmurs, rubs, or gallops  ABDOMEN: Soft, Nontender, Nondistended; Bowel sounds present  EXTREMITIES:  2+ Peripheral Pulses, No clubbing, cyanosis, or edema  PSYCH: AAOx3  NEUROLOGY: non-focal  SKIN: No rashes or lesions    LABS:                        14.3   12.73 )-----------( 248      ( 29 May 2021 07:21 )             42.3         138  |  101  |  16  ----------------------------<  100<H>  3.5   |  20<L>  |  0.90    Ca    9.4      29 May 2021 07:21  Phos  3.2       Mg     1.9         TPro  8.0  /  Alb  4.5  /  TBili  0.9  /  DBili  x   /  AST  19  /  ALT  13  /  AlkPhos  92            Urinalysis Basic - ( 28 May 2021 22:05 )    Color: Light Yellow / Appearance: Clear / S.011 / pH: x  Gluc: x / Ketone: Negative  / Bili: Negative / Urobili: Negative   Blood: x / Protein: Trace / Nitrite: Negative   Leuk Esterase: Negative / RBC: 5 /hpf / WBC 1 /HPF   Sq Epi: x / Non Sq Epi: 0 /hpf / Bacteria: Negative        RADIOLOGY & ADDITIONAL TESTS:    Imaging Personally Reviewed:    Consultant(s) Notes Reviewed:      Care Discussed with Consultants/Other Providers:

## 2021-05-30 NOTE — PHYSICAL THERAPY INITIAL EVALUATION ADULT - GAIT TRAINING, PT EVAL
pt will safely ambulate 100' using RW Independently in 2weeks pt will safely ambulate 300' using RW Independently in 2weeks

## 2021-05-30 NOTE — DISCHARGE NOTE NURSING/CASE MANAGEMENT/SOCIAL WORK - PATIENT PORTAL LINK FT
You can access the FollowMyHealth Patient Portal offered by [facility] by registering at the following website: [email or web address]. By joining [facility]’s FollowMyHealth portal, you will also be able to view your health information using other applications (apps) compatible with our system.

## 2021-05-30 NOTE — PROGRESS NOTE ADULT - PROBLEM SELECTOR PLAN 5
Hx of HTN  - hold home enalapril 10mg PO daily for now given recent soft BPs  - continue to monitor BP, resume if BP remains improved
Hx of HTN  - hold home enalapril 10mg PO daily for now given recent soft BPs  - continue to monitor BP, resume if BP remains improved

## 2021-05-30 NOTE — PROGRESS NOTE ADULT - PROBLEM SELECTOR PLAN 1
Pt presented for generalized weakness c/b fall w/o LOC or head trauma.  - likely 2/2 orthostatic hypotension (positive test in ED) vs infection of unclear source vs malnutrition (?"tea and toast diet") vs ?malignancy vs arrhythmia   - s/p 1L NS bolus in ED  - EKG (5/28): sinus with 1st degree AV block w/ PACs and J waves  - f/u repeat orthostatics  - f/u ESR, TSH  - continue to reassess need for further IVF  - start multivitamin  - low threshold for CTH given elderly age and recent fall (but denies head trauma and LOC)  - PT eval

## 2021-06-02 LAB
CULTURE RESULTS: SIGNIFICANT CHANGE UP
CULTURE RESULTS: SIGNIFICANT CHANGE UP
SPECIMEN SOURCE: SIGNIFICANT CHANGE UP
SPECIMEN SOURCE: SIGNIFICANT CHANGE UP

## 2022-08-15 PROBLEM — F32.9 MAJOR DEPRESSIVE DISORDER, SINGLE EPISODE, UNSPECIFIED: Chronic | Status: ACTIVE | Noted: 2020-07-19

## 2022-08-15 PROBLEM — E11.9 TYPE 2 DIABETES MELLITUS WITHOUT COMPLICATIONS: Chronic | Status: ACTIVE | Noted: 2020-07-19

## 2022-08-15 RX ORDER — METFORMIN HYDROCHLORIDE 850 MG/1
850 TABLET ORAL
Qty: 0 | Refills: 0 | DISCHARGE

## 2022-08-15 RX ORDER — TAMSULOSIN HYDROCHLORIDE 0.4 MG/1
1 CAPSULE ORAL
Qty: 0 | Refills: 0 | DISCHARGE

## 2022-08-15 RX ORDER — LINAGLIPTIN 5 MG/1
0 TABLET, FILM COATED ORAL
Qty: 0 | Refills: 0 | DISCHARGE

## 2022-08-15 RX ORDER — METFORMIN HYDROCHLORIDE 850 MG/1
0 TABLET ORAL
Qty: 0 | Refills: 0 | DISCHARGE

## 2022-08-17 NOTE — H&P ADULT - PROBLEM SELECTOR PROBLEM 3
Received call from microbiology. Patient tests positive for covid-19 by PCR.    Leukocytosis Urinary retention

## 2022-08-21 NOTE — ED ADULT TRIAGE NOTE - DOMESTIC TRAVEL HIGH RISK QUESTION
[facility] General Internal Medicine  General Internal Medicine  [address] [id number]  Phone: [phone]  Fax:   Follow Up Time: 1-3 Days     No

## 2025-09-14 ENCOUNTER — EMERGENCY (EMERGENCY)
Facility: HOSPITAL | Age: 85
LOS: 1 days | End: 2025-09-14
Attending: STUDENT IN AN ORGANIZED HEALTH CARE EDUCATION/TRAINING PROGRAM | Admitting: STUDENT IN AN ORGANIZED HEALTH CARE EDUCATION/TRAINING PROGRAM
Payer: MEDICARE

## 2025-09-14 VITALS
SYSTOLIC BLOOD PRESSURE: 109 MMHG | TEMPERATURE: 98 F | WEIGHT: 130.07 LBS | HEART RATE: 104 BPM | HEIGHT: 66.14 IN | OXYGEN SATURATION: 99 % | DIASTOLIC BLOOD PRESSURE: 76 MMHG | RESPIRATION RATE: 16 BRPM

## 2025-09-14 DIAGNOSIS — Z90.49 ACQUIRED ABSENCE OF OTHER SPECIFIED PARTS OF DIGESTIVE TRACT: Chronic | ICD-10-CM

## 2025-09-14 DIAGNOSIS — Z98.890 OTHER SPECIFIED POSTPROCEDURAL STATES: Chronic | ICD-10-CM

## 2025-09-14 PROBLEM — E11.9 TYPE 2 DIABETES MELLITUS WITHOUT COMPLICATIONS: Chronic | Status: ACTIVE | Noted: 2021-05-29

## 2025-09-14 PROBLEM — I10 ESSENTIAL (PRIMARY) HYPERTENSION: Chronic | Status: ACTIVE | Noted: 2021-05-29

## 2025-09-14 LAB
ANION GAP SERPL CALC-SCNC: 15 MMOL/L — HIGH (ref 7–14)
APPEARANCE UR: CLEAR — SIGNIFICANT CHANGE UP
BILIRUB UR-MCNC: NEGATIVE — SIGNIFICANT CHANGE UP
BUN SERPL-MCNC: 10 MG/DL — SIGNIFICANT CHANGE UP (ref 7–23)
CALCIUM SERPL-MCNC: 9.5 MG/DL — SIGNIFICANT CHANGE UP (ref 8.4–10.5)
CHLORIDE SERPL-SCNC: 97 MMOL/L — LOW (ref 98–107)
CO2 SERPL-SCNC: 22 MMOL/L — SIGNIFICANT CHANGE UP (ref 22–31)
COLOR SPEC: YELLOW — SIGNIFICANT CHANGE UP
COMMENT - URINE 2: SIGNIFICANT CHANGE UP
CREAT SERPL-MCNC: 0.83 MG/DL — SIGNIFICANT CHANGE UP (ref 0.5–1.3)
DIFF PNL FLD: ABNORMAL
EGFR: 86 ML/MIN/1.73M2 — SIGNIFICANT CHANGE UP
EGFR: 86 ML/MIN/1.73M2 — SIGNIFICANT CHANGE UP
GLUCOSE SERPL-MCNC: 177 MG/DL — HIGH (ref 70–99)
GLUCOSE UR QL: >=1000 MG/DL
HCT VFR BLD CALC: 46.7 % — SIGNIFICANT CHANGE UP (ref 39–50)
HGB BLD-MCNC: 15.9 G/DL — SIGNIFICANT CHANGE UP (ref 13–17)
KETONES UR QL: NEGATIVE MG/DL — SIGNIFICANT CHANGE UP
LEUKOCYTE ESTERASE UR-ACNC: NEGATIVE — SIGNIFICANT CHANGE UP
MCHC RBC-ENTMCNC: 29.9 PG — SIGNIFICANT CHANGE UP (ref 27–34)
MCHC RBC-ENTMCNC: 34 G/DL — SIGNIFICANT CHANGE UP (ref 32–36)
MCV RBC AUTO: 87.8 FL — SIGNIFICANT CHANGE UP (ref 80–100)
NITRITE UR-MCNC: NEGATIVE — SIGNIFICANT CHANGE UP
NRBC # BLD AUTO: 0 K/UL — SIGNIFICANT CHANGE UP (ref 0–0)
NRBC # FLD: 0 K/UL — SIGNIFICANT CHANGE UP (ref 0–0)
NRBC BLD AUTO-RTO: 0 /100 WBCS — SIGNIFICANT CHANGE UP (ref 0–0)
PH UR: 6.5 — SIGNIFICANT CHANGE UP (ref 5–8)
PLATELET # BLD AUTO: 251 K/UL — SIGNIFICANT CHANGE UP (ref 150–400)
PMV BLD: 10.6 FL — SIGNIFICANT CHANGE UP (ref 7–13)
POTASSIUM SERPL-MCNC: 4.8 MMOL/L — SIGNIFICANT CHANGE UP (ref 3.5–5.3)
POTASSIUM SERPL-SCNC: 4.8 MMOL/L — SIGNIFICANT CHANGE UP (ref 3.5–5.3)
PROT UR-MCNC: NEGATIVE MG/DL — SIGNIFICANT CHANGE UP
RBC # BLD: 5.32 M/UL — SIGNIFICANT CHANGE UP (ref 4.2–5.8)
RBC # FLD: 13 % — SIGNIFICANT CHANGE UP (ref 10.3–14.5)
RBC CASTS # UR COMP ASSIST: 0 /HPF — SIGNIFICANT CHANGE UP (ref 0–4)
SODIUM SERPL-SCNC: 134 MMOL/L — LOW (ref 135–145)
SP GR SPEC: 1.01 — SIGNIFICANT CHANGE UP (ref 1–1.03)
UROBILINOGEN FLD QL: 0.2 MG/DL — SIGNIFICANT CHANGE UP (ref 0.2–1)
WBC # BLD: 10.6 K/UL — HIGH (ref 3.8–10.5)
WBC # FLD AUTO: 10.6 K/UL — HIGH (ref 3.8–10.5)
WBC UR QL: SIGNIFICANT CHANGE UP /HPF (ref 0–5)

## 2025-09-14 PROCEDURE — 99284 EMERGENCY DEPT VISIT MOD MDM: CPT
